# Patient Record
Sex: FEMALE | Race: WHITE | NOT HISPANIC OR LATINO | Employment: OTHER | URBAN - METROPOLITAN AREA
[De-identification: names, ages, dates, MRNs, and addresses within clinical notes are randomized per-mention and may not be internally consistent; named-entity substitution may affect disease eponyms.]

---

## 2021-06-25 ENCOUNTER — TELEPHONE (OUTPATIENT)
Dept: SCHEDULING | Facility: IMAGING CENTER | Age: 61
End: 2021-06-25

## 2021-07-07 ENCOUNTER — TELEPHONE (OUTPATIENT)
Dept: MEDICAL GROUP | Facility: LAB | Age: 61
End: 2021-07-07

## 2021-07-07 SDOH — ECONOMIC STABILITY: HOUSING INSECURITY
IN THE LAST 12 MONTHS, WAS THERE A TIME WHEN YOU DID NOT HAVE A STEADY PLACE TO SLEEP OR SLEPT IN A SHELTER (INCLUDING NOW)?: NO

## 2021-07-07 SDOH — ECONOMIC STABILITY: INCOME INSECURITY: HOW HARD IS IT FOR YOU TO PAY FOR THE VERY BASICS LIKE FOOD, HOUSING, MEDICAL CARE, AND HEATING?: NOT HARD AT ALL

## 2021-07-07 SDOH — ECONOMIC STABILITY: FOOD INSECURITY: WITHIN THE PAST 12 MONTHS, YOU WORRIED THAT YOUR FOOD WOULD RUN OUT BEFORE YOU GOT MONEY TO BUY MORE.: NEVER TRUE

## 2021-07-07 SDOH — HEALTH STABILITY: PHYSICAL HEALTH: ON AVERAGE, HOW MANY MINUTES DO YOU ENGAGE IN EXERCISE AT THIS LEVEL?: 150+ MIN

## 2021-07-07 SDOH — HEALTH STABILITY: PHYSICAL HEALTH: ON AVERAGE, HOW MANY DAYS PER WEEK DO YOU ENGAGE IN MODERATE TO STRENUOUS EXERCISE (LIKE A BRISK WALK)?: 4 DAYS

## 2021-07-07 SDOH — ECONOMIC STABILITY: FOOD INSECURITY: WITHIN THE PAST 12 MONTHS, THE FOOD YOU BOUGHT JUST DIDN'T LAST AND YOU DIDN'T HAVE MONEY TO GET MORE.: NEVER TRUE

## 2021-07-07 SDOH — ECONOMIC STABILITY: INCOME INSECURITY: IN THE LAST 12 MONTHS, WAS THERE A TIME WHEN YOU WERE NOT ABLE TO PAY THE MORTGAGE OR RENT ON TIME?: NO

## 2021-07-07 SDOH — HEALTH STABILITY: MENTAL HEALTH
STRESS IS WHEN SOMEONE FEELS TENSE, NERVOUS, ANXIOUS, OR CAN'T SLEEP AT NIGHT BECAUSE THEIR MIND IS TROUBLED. HOW STRESSED ARE YOU?: NOT AT ALL

## 2021-07-07 SDOH — ECONOMIC STABILITY: TRANSPORTATION INSECURITY
IN THE PAST 12 MONTHS, HAS THE LACK OF TRANSPORTATION KEPT YOU FROM MEDICAL APPOINTMENTS OR FROM GETTING MEDICATIONS?: NO

## 2021-07-07 SDOH — ECONOMIC STABILITY: HOUSING INSECURITY: IN THE LAST 12 MONTHS, HOW MANY PLACES HAVE YOU LIVED?: 2

## 2021-07-07 SDOH — ECONOMIC STABILITY: TRANSPORTATION INSECURITY
IN THE PAST 12 MONTHS, HAS LACK OF RELIABLE TRANSPORTATION KEPT YOU FROM MEDICAL APPOINTMENTS, MEETINGS, WORK OR FROM GETTING THINGS NEEDED FOR DAILY LIVING?: NO

## 2021-07-07 SDOH — ECONOMIC STABILITY: TRANSPORTATION INSECURITY
IN THE PAST 12 MONTHS, HAS LACK OF TRANSPORTATION KEPT YOU FROM MEETINGS, WORK, OR FROM GETTING THINGS NEEDED FOR DAILY LIVING?: NO

## 2021-07-07 ASSESSMENT — SOCIAL DETERMINANTS OF HEALTH (SDOH)
DO YOU BELONG TO ANY CLUBS OR ORGANIZATIONS SUCH AS CHURCH GROUPS UNIONS, FRATERNAL OR ATHLETIC GROUPS, OR SCHOOL GROUPS?: NO
HOW HARD IS IT FOR YOU TO PAY FOR THE VERY BASICS LIKE FOOD, HOUSING, MEDICAL CARE, AND HEATING?: NOT HARD AT ALL
HOW OFTEN DO YOU ATTEND CHURCH OR RELIGIOUS SERVICES?: NEVER
HOW OFTEN DO YOU HAVE SIX OR MORE DRINKS ON ONE OCCASION: NEVER
IN A TYPICAL WEEK, HOW MANY TIMES DO YOU TALK ON THE PHONE WITH FAMILY, FRIENDS, OR NEIGHBORS?: ONCE A WEEK
WITHIN THE PAST 12 MONTHS, YOU WORRIED THAT YOUR FOOD WOULD RUN OUT BEFORE YOU GOT THE MONEY TO BUY MORE: NEVER TRUE
DO YOU BELONG TO ANY CLUBS OR ORGANIZATIONS SUCH AS CHURCH GROUPS UNIONS, FRATERNAL OR ATHLETIC GROUPS, OR SCHOOL GROUPS?: NO
HOW OFTEN DO YOU ATTEND CHURCH OR RELIGIOUS SERVICES?: NEVER
IN A TYPICAL WEEK, HOW MANY TIMES DO YOU TALK ON THE PHONE WITH FAMILY, FRIENDS, OR NEIGHBORS?: ONCE A WEEK
HOW OFTEN DO YOU GET TOGETHER WITH FRIENDS OR RELATIVES?: ONCE A WEEK
HOW OFTEN DO YOU HAVE A DRINK CONTAINING ALCOHOL: 2-4 TIMES A MONTH
HOW OFTEN DO YOU GET TOGETHER WITH FRIENDS OR RELATIVES?: ONCE A WEEK

## 2021-07-07 ASSESSMENT — LIFESTYLE VARIABLES
HOW OFTEN DO YOU HAVE A DRINK CONTAINING ALCOHOL: 2-4 TIMES A MONTH
HOW OFTEN DO YOU HAVE SIX OR MORE DRINKS ON ONE OCCASION: NEVER

## 2021-07-07 NOTE — TELEPHONE ENCOUNTER
NEW PATIENT VISIT PRE-VISIT PLANNING    1.  E[employer] Patient is checked in Patient Demographics?Yes    2.  Immunizations were updated in Epic using Reconcile Outside Information activity? Yes         3.  Is this appointment scheduled as a Hospital Follow-Up? No    4.  Patient is due for the following Health Maintenance Topics:   Health Maintenance Due   Topic Date Due   • HEPATITIS C SCREENING  Never done   • IMM DTaP/Tdap/Td Vaccine (1 - Tdap) Never done   • PAP SMEAR  Never done   • MAMMOGRAM  Never done   • COLONOSCOPY  Never done   • IMM ZOSTER VACCINES (1 of 2) Never done     5.  Reviewed/Updated the following with patient:       •   Preferred Pharmacy? No       •   Preferred Lab? No       •   Preferred Communication? No       •   Allergies? No       •   Medications? NO    6.  Updated Care Team?       •   DME Company (gait device, O2, CPAP, etc.) NO       •   Other Specialists (eye doctor, derm, GYN, cardiology, endo, etc): NO    7.  AHA (Puls8) form printed for Provider? N/A

## 2021-07-07 NOTE — TELEPHONE ENCOUNTER
Left message for patient to call back regarding pre-visit planning. Please transfer call to [phone]

## 2021-07-14 ENCOUNTER — OFFICE VISIT (OUTPATIENT)
Dept: MEDICAL GROUP | Facility: LAB | Age: 61
End: 2021-07-14
Payer: COMMERCIAL

## 2021-07-14 VITALS
SYSTOLIC BLOOD PRESSURE: 92 MMHG | HEART RATE: 66 BPM | TEMPERATURE: 103 F | BODY MASS INDEX: 19.61 KG/M2 | WEIGHT: 122 LBS | HEIGHT: 66 IN | DIASTOLIC BLOOD PRESSURE: 64 MMHG | RESPIRATION RATE: 14 BRPM | OXYGEN SATURATION: 98 %

## 2021-07-14 DIAGNOSIS — Z13.6 SCREENING FOR CARDIOVASCULAR CONDITION: ICD-10-CM

## 2021-07-14 DIAGNOSIS — Z12.12 SCREENING FOR COLORECTAL CANCER: ICD-10-CM

## 2021-07-14 DIAGNOSIS — Z76.89 ENCOUNTER TO ESTABLISH CARE: ICD-10-CM

## 2021-07-14 DIAGNOSIS — Z11.59 NEED FOR HEPATITIS C SCREENING TEST: ICD-10-CM

## 2021-07-14 DIAGNOSIS — Z17.0 MALIGNANT NEOPLASM OF UPPER-INNER QUADRANT OF LEFT BREAST IN FEMALE, ESTROGEN RECEPTOR POSITIVE (HCC): ICD-10-CM

## 2021-07-14 DIAGNOSIS — Z12.11 SCREENING FOR COLORECTAL CANCER: ICD-10-CM

## 2021-07-14 DIAGNOSIS — C50.212 MALIGNANT NEOPLASM OF UPPER-INNER QUADRANT OF LEFT BREAST IN FEMALE, ESTROGEN RECEPTOR POSITIVE (HCC): ICD-10-CM

## 2021-07-14 PROBLEM — C50.919 CHEK2-RELATED BREAST CANCER (HCC): Status: ACTIVE | Noted: 2020-11-17

## 2021-07-14 PROBLEM — Z15.89 CHEK2-RELATED BREAST CANCER (HCC): Status: ACTIVE | Noted: 2020-11-17

## 2021-07-14 PROBLEM — Z15.02 CHEK2-RELATED BREAST CANCER (HCC): Status: ACTIVE | Noted: 2020-11-17

## 2021-07-14 PROBLEM — Z15.09 CHEK2-RELATED BREAST CANCER (HCC): Status: ACTIVE | Noted: 2020-11-17

## 2021-07-14 PROCEDURE — 99203 OFFICE O/P NEW LOW 30 MIN: CPT | Performed by: FAMILY MEDICINE

## 2021-07-14 ASSESSMENT — ENCOUNTER SYMPTOMS
NERVOUS/ANXIOUS: 0
WHEEZING: 0
DEPRESSION: 0
DIZZINESS: 0
ABDOMINAL PAIN: 0
BLURRED VISION: 0
NAUSEA: 0
CHILLS: 0
HEADACHES: 0
PALPITATIONS: 0
VOMITING: 0
DOUBLE VISION: 0
FEVER: 0
SHORTNESS OF BREATH: 0
DIARRHEA: 0

## 2021-07-14 ASSESSMENT — PATIENT HEALTH QUESTIONNAIRE - PHQ9: CLINICAL INTERPRETATION OF PHQ2 SCORE: 0

## 2021-07-14 NOTE — PROGRESS NOTES
Carmela Galloway is a 61 y.o. female here for   Chief Complaint   Patient presents with   • Establish Care       HPI:  Carmela is a very pleasant 61 y.o. female.     #Establish care   -Reviewed all past medical history, family history, social history.  -Reviewed all screening/vaccinations: Patient is due for colonoscopy, Pap smear, lab work including lipid profile, hepatitis C screening.  -Diet and Exercise: Appropriate for age, living in [facility], hiking regularly.  -Tobacco, alcohol, recreational drug use: Occasional alcohol use.  Denies any tobacco, recreational illicit drug use.  -Sexually active: Monogamous with male partner, no concerns regarding relationship  -Occupation: Retired nutritional therapist.    #Breast cancer   -Diagnosed in end of Oct 2020.   -s/p lumpectomy  -s/p hormone therapy.   -Has been following up with oncologist on regular intervals back in [place]; however, since she is moved she is now looking for referral for further follow-up here in [place].  -Patient states he is feeling well, has no concerns, questions at this time.    Current medicines (including changes today)  Current Outpatient Medications   Medication Sig Dispense Refill   • BETAINE PO Take  by mouth. Plus HP       No current facility-administered medications for this visit.     She  has a past medical history of Cancer (HCC).  She  has a past surgical history that includes lumpectomy and eye surgery.  Social History     Tobacco Use   • Smoking status: Never Smoker   • Smokeless tobacco: Never Used   Vaping Use   • Vaping Use: Never used   Substance Use Topics   • Alcohol use: Yes     Comment: 1-2 glasses of wine per week   • Drug use: Never     Social History     Social History Narrative   • Not on file     Family History   Problem Relation Age of Onset   • Arthritis Mother    • Cancer Mother    • Stroke Mother    • Cancer Maternal Grandmother    • Parkinson's Disease Father      Family Status   Relation Name Status   •  "Mo Gabby [place] (Not Specified)   • MGMo Lupe [place] (Not Specified)   • Fa Johanna [place] (Not Specified)       ROS  Review of Systems   Constitutional: Negative for chills and fever.   HENT: Negative for hearing loss.    Eyes: Negative for blurred vision and double vision.   Respiratory: Negative for shortness of breath and wheezing.    Cardiovascular: Negative for chest pain and palpitations.   Gastrointestinal: Negative for abdominal pain, diarrhea, nausea and vomiting.   Neurological: Negative for dizziness and headaches.   Psychiatric/Behavioral: Negative for depression. The patient is not nervous/anxious.         Objective:     BP (!) 92/64   Pulse 66   Temp (!) 39.4 °C (103 °F) (Temporal)   Resp 14   Ht 1.67 m (5' 5.75\")   Wt 55.3 kg (122 lb)   SpO2 98%  Body mass index is 19.84 kg/m².  Physical Exam:    Constitutional: Alert, no distress.  Skin: Warm, dry, good turgor, no rashes in visible areas.  Eye: Equal, round and reactive, conjunctiva clear, lids normal.  ENMT: Lips without lesions, good dentition, oropharynx clear. TM's pearly gray with normal light reflexes bilaterally  Neck: Trachea midline, no masses, no thyromegaly. No cervical or supraclavicular lymphadenopathy.  Respiratory: Unlabored respiratory effort, lungs clear to auscultation bilaterally, no wheezes, rales, or ronchi.  Cardiovascular: Normal S1, S2, RRR, no murmur, no edema.  Abdomen: Soft, non-tender, no masses, no hepatosplenomegaly.  Psych: Alert and oriented x3, normal affect and mood.      Assessment and Plan:   The following treatment plan was discussed    1. Encounter to establish care  -All questions concerns were answered at this time.  -Vaccinations/screenings needed at this time: see below   -Labs reviewed, no concerns. Will recheck labs as below.   -Discussed the importance of a healthy, Mediterranean style diet, routine exercise regimen.  -Discussed general safety measures including seatbelts, helmets, avoidance of " smoking, sunscreen, hydration.  -Follow-up for general physical exam on a yearly basis, sooner if needed.  - CBC WITHOUT DIFFERENTIAL; Future  - Comp Metabolic Panel; Future    2. Malignant neoplasm of upper-inner quadrant of left breast in female, estrogen receptor positive (HCC)  -Status: stable. No concerns at this time. Will refer to oncology for regular follow up.   - REFERRAL TO HEMATOLOGY ONCOLOGY    4. Screening for colorectal cancer  - REFERRAL TO GI FOR COLONOSCOPY    5. Screening for cardiovascular condition  - Lipid Profile; Future    6. Need for hepatitis C screening test  - HEP C VIRUS ANTIBODY; Future      Records requested.  Followup: Return in about 1 year (around 7/14/2022), or if symptoms worsen or fail to improve.         This note was created using voice recognition software. I have made every reasonable attempt to correct errors, however, I do anticipate some grammatical errors.

## 2021-11-29 ENCOUNTER — HOSPITAL ENCOUNTER (OUTPATIENT)
Dept: LAB | Facility: MEDICAL CENTER | Age: 61
End: 2021-11-29
Attending: FAMILY MEDICINE
Payer: COMMERCIAL

## 2021-11-29 DIAGNOSIS — Z13.6 SCREENING FOR CARDIOVASCULAR CONDITION: ICD-10-CM

## 2021-11-29 DIAGNOSIS — Z76.89 ENCOUNTER TO ESTABLISH CARE: ICD-10-CM

## 2021-11-29 DIAGNOSIS — Z11.59 NEED FOR HEPATITIS C SCREENING TEST: ICD-10-CM

## 2021-11-29 LAB
ALBUMIN SERPL BCP-MCNC: 4.4 G/DL (ref 3.2–4.9)
ALBUMIN/GLOB SERPL: 2 G/DL
ALP SERPL-CCNC: 64 U/L (ref 30–99)
ALT SERPL-CCNC: 21 U/L (ref 2–50)
ANION GAP SERPL CALC-SCNC: 8 MMOL/L (ref 7–16)
AST SERPL-CCNC: 20 U/L (ref 12–45)
BILIRUB SERPL-MCNC: 0.4 MG/DL (ref 0.1–1.5)
BUN SERPL-MCNC: 18 MG/DL (ref 8–22)
CALCIUM SERPL-MCNC: 9.5 MG/DL (ref 8.5–10.5)
CHLORIDE SERPL-SCNC: 105 MMOL/L (ref 96–112)
CHOLEST SERPL-MCNC: 224 MG/DL (ref 100–199)
CO2 SERPL-SCNC: 27 MMOL/L (ref 20–33)
CREAT SERPL-MCNC: 0.68 MG/DL (ref 0.5–1.4)
ERYTHROCYTE [DISTWIDTH] IN BLOOD BY AUTOMATED COUNT: 45.5 FL (ref 35.9–50)
FASTING STATUS PATIENT QL REPORTED: NORMAL
GLOBULIN SER CALC-MCNC: 2.2 G/DL (ref 1.9–3.5)
GLUCOSE SERPL-MCNC: 96 MG/DL (ref 65–99)
HCT VFR BLD AUTO: 43.5 % (ref 37–47)
HCV AB SER QL: NORMAL
HDLC SERPL-MCNC: 97 MG/DL
HGB BLD-MCNC: 14.1 G/DL (ref 12–16)
LDLC SERPL CALC-MCNC: 118 MG/DL
MCH RBC QN AUTO: 31.1 PG (ref 27–33)
MCHC RBC AUTO-ENTMCNC: 32.4 G/DL (ref 33.6–35)
MCV RBC AUTO: 95.8 FL (ref 81.4–97.8)
PLATELET # BLD AUTO: 196 K/UL (ref 164–446)
PMV BLD AUTO: 10.9 FL (ref 9–12.9)
POTASSIUM SERPL-SCNC: 4.2 MMOL/L (ref 3.6–5.5)
PROT SERPL-MCNC: 6.6 G/DL (ref 6–8.2)
RBC # BLD AUTO: 4.54 M/UL (ref 4.2–5.4)
SODIUM SERPL-SCNC: 140 MMOL/L (ref 135–145)
TRIGL SERPL-MCNC: 44 MG/DL (ref 0–149)
WBC # BLD AUTO: 3.9 K/UL (ref 4.8–10.8)

## 2021-11-29 PROCEDURE — 36415 COLL VENOUS BLD VENIPUNCTURE: CPT

## 2021-11-29 PROCEDURE — 86803 HEPATITIS C AB TEST: CPT

## 2021-11-29 PROCEDURE — 85027 COMPLETE CBC AUTOMATED: CPT

## 2021-11-29 PROCEDURE — 80061 LIPID PANEL: CPT

## 2021-11-29 PROCEDURE — 80053 COMPREHEN METABOLIC PANEL: CPT

## 2022-02-07 ENCOUNTER — PRE-ADMISSION TESTING (OUTPATIENT)
Dept: ADMISSIONS | Facility: MEDICAL CENTER | Age: 62
DRG: 331 | End: 2022-02-07
Attending: SURGERY
Payer: COMMERCIAL

## 2022-02-07 DIAGNOSIS — Z01.812 PRE-OPERATIVE LABORATORY EXAMINATION: ICD-10-CM

## 2022-02-07 LAB
ANION GAP SERPL CALC-SCNC: 12 MMOL/L (ref 7–16)
BUN SERPL-MCNC: 16 MG/DL (ref 8–22)
CALCIUM SERPL-MCNC: 9.5 MG/DL (ref 8.5–10.5)
CHLORIDE SERPL-SCNC: 102 MMOL/L (ref 96–112)
CO2 SERPL-SCNC: 23 MMOL/L (ref 20–33)
CREAT SERPL-MCNC: 0.6 MG/DL (ref 0.5–1.4)
EKG IMPRESSION: NORMAL
ERYTHROCYTE [DISTWIDTH] IN BLOOD BY AUTOMATED COUNT: 43.3 FL (ref 35.9–50)
GLUCOSE SERPL-MCNC: 104 MG/DL (ref 65–99)
HCT VFR BLD AUTO: 41.1 % (ref 37–47)
HGB BLD-MCNC: 13.7 G/DL (ref 12–16)
MCH RBC QN AUTO: 31.9 PG (ref 27–33)
MCHC RBC AUTO-ENTMCNC: 33.3 G/DL (ref 33.6–35)
MCV RBC AUTO: 95.8 FL (ref 81.4–97.8)
PLATELET # BLD AUTO: 191 K/UL (ref 164–446)
PMV BLD AUTO: 10.6 FL (ref 9–12.9)
POTASSIUM SERPL-SCNC: 4 MMOL/L (ref 3.6–5.5)
RBC # BLD AUTO: 4.29 M/UL (ref 4.2–5.4)
SODIUM SERPL-SCNC: 137 MMOL/L (ref 135–145)
WBC # BLD AUTO: 5.3 K/UL (ref 4.8–10.8)

## 2022-02-07 PROCEDURE — 85027 COMPLETE CBC AUTOMATED: CPT

## 2022-02-07 PROCEDURE — 93010 ELECTROCARDIOGRAM REPORT: CPT | Performed by: INTERNAL MEDICINE

## 2022-02-07 PROCEDURE — 93005 ELECTROCARDIOGRAM TRACING: CPT

## 2022-02-07 PROCEDURE — 36415 COLL VENOUS BLD VENIPUNCTURE: CPT

## 2022-02-07 PROCEDURE — 80048 BASIC METABOLIC PNL TOTAL CA: CPT

## 2022-02-07 ASSESSMENT — FIBROSIS 4 INDEX: FIB4 SCORE: 1.4

## 2022-02-25 ENCOUNTER — PRE-ADMISSION TESTING (OUTPATIENT)
Dept: ADMISSIONS | Facility: MEDICAL CENTER | Age: 62
DRG: 331 | End: 2022-02-25
Attending: SURGERY
Payer: COMMERCIAL

## 2022-02-25 DIAGNOSIS — Z01.812 PRE-OPERATIVE LABORATORY EXAMINATION: ICD-10-CM

## 2022-02-25 LAB — COVID ORDER STATUS COVID19: NORMAL

## 2022-02-25 PROCEDURE — U0005 INFEC AGEN DETEC AMPLI PROBE: HCPCS

## 2022-02-25 PROCEDURE — U0003 INFECTIOUS AGENT DETECTION BY NUCLEIC ACID (DNA OR RNA); SEVERE ACUTE RESPIRATORY SYNDROME CORONAVIRUS 2 (SARS-COV-2) (CORONAVIRUS DISEASE [COVID-19]), AMPLIFIED PROBE TECHNIQUE, MAKING USE OF HIGH THROUGHPUT TECHNOLOGIES AS DESCRIBED BY CMS-2020-01-R: HCPCS

## 2022-02-26 LAB
SARS-COV-2 RNA RESP QL NAA+PROBE: NOTDETECTED
SPECIMEN SOURCE: NORMAL

## 2022-03-01 ENCOUNTER — ANESTHESIA (OUTPATIENT)
Dept: SURGERY | Facility: MEDICAL CENTER | Age: 62
DRG: 331 | End: 2022-03-01
Payer: COMMERCIAL

## 2022-03-01 ENCOUNTER — ANESTHESIA EVENT (OUTPATIENT)
Dept: SURGERY | Facility: MEDICAL CENTER | Age: 62
DRG: 331 | End: 2022-03-01
Payer: COMMERCIAL

## 2022-03-01 ENCOUNTER — HOSPITAL ENCOUNTER (INPATIENT)
Facility: MEDICAL CENTER | Age: 62
LOS: 2 days | DRG: 331 | End: 2022-03-03
Attending: SURGERY | Admitting: SURGERY
Payer: COMMERCIAL

## 2022-03-01 DIAGNOSIS — G89.18 POST-OP PAIN: ICD-10-CM

## 2022-03-01 LAB
GLUCOSE BLD STRIP.AUTO-MCNC: 92 MG/DL (ref 65–99)
PATHOLOGY CONSULT NOTE: NORMAL

## 2022-03-01 PROCEDURE — 160002 HCHG RECOVERY MINUTES (STAT): Performed by: SURGERY

## 2022-03-01 PROCEDURE — 88342 IMHCHEM/IMCYTCHM 1ST ANTB: CPT

## 2022-03-01 PROCEDURE — 88305 TISSUE EXAM BY PATHOLOGIST: CPT

## 2022-03-01 PROCEDURE — 160048 HCHG OR STATISTICAL LEVEL 1-5: Performed by: SURGERY

## 2022-03-01 PROCEDURE — 160009 HCHG ANES TIME/MIN: Performed by: SURGERY

## 2022-03-01 PROCEDURE — 0DTN0ZZ RESECTION OF SIGMOID COLON, OPEN APPROACH: ICD-10-PCS | Performed by: SURGERY

## 2022-03-01 PROCEDURE — 160042 HCHG SURGERY MINUTES - EA ADDL 1 MIN LEVEL 5: Performed by: SURGERY

## 2022-03-01 PROCEDURE — 88309 TISSUE EXAM BY PATHOLOGIST: CPT

## 2022-03-01 PROCEDURE — 700102 HCHG RX REV CODE 250 W/ 637 OVERRIDE(OP): Performed by: INTERNAL MEDICINE

## 2022-03-01 PROCEDURE — 700104 HCHG RX REV CODE 254: Performed by: INTERNAL MEDICINE

## 2022-03-01 PROCEDURE — 501838 HCHG SUTURE GENERAL: Performed by: SURGERY

## 2022-03-01 PROCEDURE — A6402 STERILE GAUZE <= 16 SQ IN: HCPCS | Performed by: SURGERY

## 2022-03-01 PROCEDURE — 82962 GLUCOSE BLOOD TEST: CPT

## 2022-03-01 PROCEDURE — A9270 NON-COVERED ITEM OR SERVICE: HCPCS | Performed by: SURGERY

## 2022-03-01 PROCEDURE — 700111 HCHG RX REV CODE 636 W/ 250 OVERRIDE (IP): Performed by: SURGERY

## 2022-03-01 PROCEDURE — 700101 HCHG RX REV CODE 250: Performed by: SURGERY

## 2022-03-01 PROCEDURE — 160036 HCHG PACU - EA ADDL 30 MINS PHASE I: Performed by: SURGERY

## 2022-03-01 PROCEDURE — 700111 HCHG RX REV CODE 636 W/ 250 OVERRIDE (IP): Performed by: INTERNAL MEDICINE

## 2022-03-01 PROCEDURE — 700101 HCHG RX REV CODE 250: Performed by: INTERNAL MEDICINE

## 2022-03-01 PROCEDURE — 700105 HCHG RX REV CODE 258: Performed by: SURGERY

## 2022-03-01 PROCEDURE — 160035 HCHG PACU - 1ST 60 MINS PHASE I: Performed by: SURGERY

## 2022-03-01 PROCEDURE — 160031 HCHG SURGERY MINUTES - 1ST 30 MINS LEVEL 5: Performed by: SURGERY

## 2022-03-01 PROCEDURE — 700102 HCHG RX REV CODE 250 W/ 637 OVERRIDE(OP): Performed by: SURGERY

## 2022-03-01 PROCEDURE — A9270 NON-COVERED ITEM OR SERVICE: HCPCS | Performed by: INTERNAL MEDICINE

## 2022-03-01 PROCEDURE — 64488 TAP BLOCK BI INJECTION: CPT | Performed by: SURGERY

## 2022-03-01 PROCEDURE — 3E0T3BZ INTRODUCTION OF ANESTHETIC AGENT INTO PERIPHERAL NERVES AND PLEXI, PERCUTANEOUS APPROACH: ICD-10-PCS | Performed by: INTERNAL MEDICINE

## 2022-03-01 PROCEDURE — 0DBP0ZZ EXCISION OF RECTUM, OPEN APPROACH: ICD-10-PCS | Performed by: SURGERY

## 2022-03-01 PROCEDURE — 502714 HCHG ROBOTIC SURGERY SERVICES: Performed by: SURGERY

## 2022-03-01 PROCEDURE — 88341 IMHCHEM/IMCYTCHM EA ADD ANTB: CPT | Mod: 91

## 2022-03-01 PROCEDURE — 8E0W0CZ ROBOTIC ASSISTED PROCEDURE OF TRUNK REGION, OPEN APPROACH: ICD-10-PCS | Performed by: SURGERY

## 2022-03-01 PROCEDURE — 770001 HCHG ROOM/CARE - MED/SURG/GYN PRIV*

## 2022-03-01 RX ORDER — LABETALOL HYDROCHLORIDE 5 MG/ML
5 INJECTION, SOLUTION INTRAVENOUS
Status: DISCONTINUED | OUTPATIENT
Start: 2022-03-01 | End: 2022-03-01 | Stop reason: HOSPADM

## 2022-03-01 RX ORDER — HYDROMORPHONE HYDROCHLORIDE 1 MG/ML
0.4 INJECTION, SOLUTION INTRAMUSCULAR; INTRAVENOUS; SUBCUTANEOUS
Status: DISCONTINUED | OUTPATIENT
Start: 2022-03-01 | End: 2022-03-01 | Stop reason: HOSPADM

## 2022-03-01 RX ORDER — CELECOXIB 200 MG/1
200 CAPSULE ORAL ONCE
Status: COMPLETED | OUTPATIENT
Start: 2022-03-01 | End: 2022-03-01

## 2022-03-01 RX ORDER — OXYCODONE HYDROCHLORIDE 5 MG/1
5 TABLET ORAL
Status: DISCONTINUED | OUTPATIENT
Start: 2022-03-01 | End: 2022-03-03 | Stop reason: HOSPADM

## 2022-03-01 RX ORDER — LIDOCAINE HYDROCHLORIDE 40 MG/ML
SOLUTION TOPICAL PRN
Status: DISCONTINUED | OUTPATIENT
Start: 2022-03-01 | End: 2022-03-01 | Stop reason: SURG

## 2022-03-01 RX ORDER — MIDAZOLAM HYDROCHLORIDE 1 MG/ML
INJECTION INTRAMUSCULAR; INTRAVENOUS PRN
Status: DISCONTINUED | OUTPATIENT
Start: 2022-03-01 | End: 2022-03-01 | Stop reason: SURG

## 2022-03-01 RX ORDER — ACETAMINOPHEN 500 MG
1000 TABLET ORAL ONCE
Status: COMPLETED | OUTPATIENT
Start: 2022-03-01 | End: 2022-03-01

## 2022-03-01 RX ORDER — ACETAMINOPHEN 500 MG
1000 TABLET ORAL EVERY 6 HOURS PRN
Status: DISCONTINUED | OUTPATIENT
Start: 2022-03-06 | End: 2022-03-03

## 2022-03-01 RX ORDER — BUPIVACAINE HYDROCHLORIDE AND EPINEPHRINE 5; 5 MG/ML; UG/ML
INJECTION, SOLUTION EPIDURAL; INTRACAUDAL; PERINEURAL
Status: DISCONTINUED | OUTPATIENT
Start: 2022-03-01 | End: 2022-03-01 | Stop reason: HOSPADM

## 2022-03-01 RX ORDER — SCOLOPAMINE TRANSDERMAL SYSTEM 1 MG/1
1 PATCH, EXTENDED RELEASE TRANSDERMAL
Status: DISCONTINUED | OUTPATIENT
Start: 2022-03-01 | End: 2022-03-01 | Stop reason: HOSPADM

## 2022-03-01 RX ORDER — BUPIVACAINE HYDROCHLORIDE 5 MG/ML
INJECTION, SOLUTION EPIDURAL; INTRACAUDAL PRN
Status: DISCONTINUED | OUTPATIENT
Start: 2022-03-01 | End: 2022-03-01 | Stop reason: SURG

## 2022-03-01 RX ORDER — ONDANSETRON 2 MG/ML
INJECTION INTRAMUSCULAR; INTRAVENOUS PRN
Status: DISCONTINUED | OUTPATIENT
Start: 2022-03-01 | End: 2022-03-01 | Stop reason: SURG

## 2022-03-01 RX ORDER — LIDOCAINE HYDROCHLORIDE 20 MG/ML
INJECTION, SOLUTION EPIDURAL; INFILTRATION; INTRACAUDAL; PERINEURAL PRN
Status: DISCONTINUED | OUTPATIENT
Start: 2022-03-01 | End: 2022-03-01 | Stop reason: SURG

## 2022-03-01 RX ORDER — OXYCODONE HYDROCHLORIDE 5 MG/1
2.5 TABLET ORAL
Status: DISCONTINUED | OUTPATIENT
Start: 2022-03-01 | End: 2022-03-03 | Stop reason: HOSPADM

## 2022-03-01 RX ORDER — PHENYLEPHRINE HCL IN 0.9% NACL 0.5 MG/5ML
SYRINGE (ML) INTRAVENOUS PRN
Status: DISCONTINUED | OUTPATIENT
Start: 2022-03-01 | End: 2022-03-01 | Stop reason: SURG

## 2022-03-01 RX ORDER — HALOPERIDOL 5 MG/ML
1 INJECTION INTRAMUSCULAR
Status: DISCONTINUED | OUTPATIENT
Start: 2022-03-01 | End: 2022-03-01 | Stop reason: HOSPADM

## 2022-03-01 RX ORDER — HALOPERIDOL 5 MG/ML
1 INJECTION INTRAMUSCULAR EVERY 6 HOURS PRN
Status: DISCONTINUED | OUTPATIENT
Start: 2022-03-01 | End: 2022-03-03 | Stop reason: HOSPADM

## 2022-03-01 RX ORDER — METRONIDAZOLE 500 MG/1
500 TABLET ORAL 3 TIMES DAILY
Status: ON HOLD | COMMUNITY
End: 2022-03-01

## 2022-03-01 RX ORDER — NEOMYCIN SULFATE 500 MG/1
1000 TABLET ORAL 3 TIMES DAILY
Status: ON HOLD | COMMUNITY
End: 2022-03-01

## 2022-03-01 RX ORDER — SCOLOPAMINE TRANSDERMAL SYSTEM 1 MG/1
1 PATCH, EXTENDED RELEASE TRANSDERMAL
Status: DISCONTINUED | OUTPATIENT
Start: 2022-03-01 | End: 2022-03-03 | Stop reason: HOSPADM

## 2022-03-01 RX ORDER — HYDROMORPHONE HYDROCHLORIDE 1 MG/ML
0.25 INJECTION, SOLUTION INTRAMUSCULAR; INTRAVENOUS; SUBCUTANEOUS
Status: DISCONTINUED | OUTPATIENT
Start: 2022-03-01 | End: 2022-03-03 | Stop reason: HOSPADM

## 2022-03-01 RX ORDER — KETAMINE HYDROCHLORIDE 50 MG/ML
INJECTION, SOLUTION INTRAMUSCULAR; INTRAVENOUS PRN
Status: DISCONTINUED | OUTPATIENT
Start: 2022-03-01 | End: 2022-03-01 | Stop reason: SURG

## 2022-03-01 RX ORDER — HYDROMORPHONE HYDROCHLORIDE 2 MG/ML
INJECTION, SOLUTION INTRAMUSCULAR; INTRAVENOUS; SUBCUTANEOUS PRN
Status: DISCONTINUED | OUTPATIENT
Start: 2022-03-01 | End: 2022-03-01 | Stop reason: SURG

## 2022-03-01 RX ORDER — DEXAMETHASONE SODIUM PHOSPHATE 4 MG/ML
4 INJECTION, SOLUTION INTRA-ARTICULAR; INTRALESIONAL; INTRAMUSCULAR; INTRAVENOUS; SOFT TISSUE
Status: DISCONTINUED | OUTPATIENT
Start: 2022-03-01 | End: 2022-03-03 | Stop reason: HOSPADM

## 2022-03-01 RX ORDER — ROCURONIUM BROMIDE 10 MG/ML
INJECTION, SOLUTION INTRAVENOUS PRN
Status: DISCONTINUED | OUTPATIENT
Start: 2022-03-01 | End: 2022-03-01 | Stop reason: SURG

## 2022-03-01 RX ORDER — HYDROMORPHONE HYDROCHLORIDE 1 MG/ML
0.2 INJECTION, SOLUTION INTRAMUSCULAR; INTRAVENOUS; SUBCUTANEOUS
Status: DISCONTINUED | OUTPATIENT
Start: 2022-03-01 | End: 2022-03-01 | Stop reason: HOSPADM

## 2022-03-01 RX ORDER — CEFOTETAN DISODIUM 2 G/20ML
INJECTION, POWDER, FOR SOLUTION INTRAMUSCULAR; INTRAVENOUS PRN
Status: DISCONTINUED | OUTPATIENT
Start: 2022-03-01 | End: 2022-03-01 | Stop reason: SURG

## 2022-03-01 RX ORDER — TRAZODONE HYDROCHLORIDE 50 MG/1
50 TABLET ORAL NIGHTLY PRN
Status: DISCONTINUED | OUTPATIENT
Start: 2022-03-01 | End: 2022-03-03 | Stop reason: HOSPADM

## 2022-03-01 RX ORDER — CALCIUM CARBONATE 500 MG/1
500 TABLET, CHEWABLE ORAL
Status: DISCONTINUED | OUTPATIENT
Start: 2022-03-01 | End: 2022-03-03 | Stop reason: HOSPADM

## 2022-03-01 RX ORDER — ONDANSETRON 2 MG/ML
4 INJECTION INTRAMUSCULAR; INTRAVENOUS EVERY 4 HOURS PRN
Status: DISCONTINUED | OUTPATIENT
Start: 2022-03-01 | End: 2022-03-03 | Stop reason: HOSPADM

## 2022-03-01 RX ORDER — SODIUM CHLORIDE, SODIUM LACTATE, POTASSIUM CHLORIDE, CALCIUM CHLORIDE 600; 310; 30; 20 MG/100ML; MG/100ML; MG/100ML; MG/100ML
INJECTION, SOLUTION INTRAVENOUS CONTINUOUS
Status: ACTIVE | OUTPATIENT
Start: 2022-03-01 | End: 2022-03-01

## 2022-03-01 RX ORDER — ONDANSETRON 2 MG/ML
4 INJECTION INTRAMUSCULAR; INTRAVENOUS
Status: DISCONTINUED | OUTPATIENT
Start: 2022-03-01 | End: 2022-03-01 | Stop reason: HOSPADM

## 2022-03-01 RX ORDER — MEPERIDINE HYDROCHLORIDE 25 MG/ML
12.5 INJECTION INTRAMUSCULAR; INTRAVENOUS; SUBCUTANEOUS
Status: DISCONTINUED | OUTPATIENT
Start: 2022-03-01 | End: 2022-03-01 | Stop reason: HOSPADM

## 2022-03-01 RX ORDER — OXYCODONE HCL 5 MG/5 ML
5 SOLUTION, ORAL ORAL
Status: DISCONTINUED | OUTPATIENT
Start: 2022-03-01 | End: 2022-03-01 | Stop reason: HOSPADM

## 2022-03-01 RX ORDER — DEXAMETHASONE SODIUM PHOSPHATE 4 MG/ML
INJECTION, SOLUTION INTRA-ARTICULAR; INTRALESIONAL; INTRAMUSCULAR; INTRAVENOUS; SOFT TISSUE PRN
Status: DISCONTINUED | OUTPATIENT
Start: 2022-03-01 | End: 2022-03-01 | Stop reason: SURG

## 2022-03-01 RX ORDER — INDOCYANINE GREEN AND WATER 25 MG
KIT INJECTION PRN
Status: DISCONTINUED | OUTPATIENT
Start: 2022-03-01 | End: 2022-03-01 | Stop reason: SURG

## 2022-03-01 RX ORDER — DIPHENHYDRAMINE HYDROCHLORIDE 50 MG/ML
25 INJECTION INTRAMUSCULAR; INTRAVENOUS EVERY 6 HOURS PRN
Status: DISCONTINUED | OUTPATIENT
Start: 2022-03-01 | End: 2022-03-03 | Stop reason: HOSPADM

## 2022-03-01 RX ORDER — KETOROLAC TROMETHAMINE 30 MG/ML
INJECTION, SOLUTION INTRAMUSCULAR; INTRAVENOUS PRN
Status: DISCONTINUED | OUTPATIENT
Start: 2022-03-01 | End: 2022-03-01 | Stop reason: SURG

## 2022-03-01 RX ORDER — HYDROMORPHONE HYDROCHLORIDE 1 MG/ML
0.1 INJECTION, SOLUTION INTRAMUSCULAR; INTRAVENOUS; SUBCUTANEOUS
Status: DISCONTINUED | OUTPATIENT
Start: 2022-03-01 | End: 2022-03-01 | Stop reason: HOSPADM

## 2022-03-01 RX ORDER — DIPHENHYDRAMINE HCL 25 MG
25 TABLET ORAL EVERY 6 HOURS PRN
Status: DISCONTINUED | OUTPATIENT
Start: 2022-03-01 | End: 2022-03-03 | Stop reason: HOSPADM

## 2022-03-01 RX ORDER — DIPHENHYDRAMINE HYDROCHLORIDE 50 MG/ML
12.5 INJECTION INTRAMUSCULAR; INTRAVENOUS
Status: DISCONTINUED | OUTPATIENT
Start: 2022-03-01 | End: 2022-03-01 | Stop reason: HOSPADM

## 2022-03-01 RX ORDER — KETOROLAC TROMETHAMINE 30 MG/ML
30 INJECTION, SOLUTION INTRAMUSCULAR; INTRAVENOUS EVERY 6 HOURS
Status: DISCONTINUED | OUTPATIENT
Start: 2022-03-01 | End: 2022-03-03

## 2022-03-01 RX ORDER — ACETAMINOPHEN 500 MG
1000 TABLET ORAL EVERY 6 HOURS
Status: DISCONTINUED | OUTPATIENT
Start: 2022-03-01 | End: 2022-03-03

## 2022-03-01 RX ORDER — IBUPROFEN 800 MG/1
800 TABLET ORAL 3 TIMES DAILY PRN
Status: DISCONTINUED | OUTPATIENT
Start: 2022-03-04 | End: 2022-03-03

## 2022-03-01 RX ORDER — OXYCODONE HCL 5 MG/5 ML
10 SOLUTION, ORAL ORAL
Status: DISCONTINUED | OUTPATIENT
Start: 2022-03-01 | End: 2022-03-01 | Stop reason: HOSPADM

## 2022-03-01 RX ADMIN — HYDROMORPHONE HYDROCHLORIDE 0.2 MG: 1 INJECTION, SOLUTION INTRAMUSCULAR; INTRAVENOUS; SUBCUTANEOUS at 10:17

## 2022-03-01 RX ADMIN — KETAMINE HYDROCHLORIDE 20 MG: 50 INJECTION INTRAMUSCULAR; INTRAVENOUS at 07:51

## 2022-03-01 RX ADMIN — LIDOCAINE HYDROCHLORIDE 3 ML: 40 SOLUTION TOPICAL at 07:53

## 2022-03-01 RX ADMIN — CELECOXIB 200 MG: 200 CAPSULE ORAL at 06:51

## 2022-03-01 RX ADMIN — KETAMINE HYDROCHLORIDE 10 MG: 50 INJECTION INTRAMUSCULAR; INTRAVENOUS at 08:20

## 2022-03-01 RX ADMIN — HYDROMORPHONE HYDROCHLORIDE 0.2 MG: 1 INJECTION, SOLUTION INTRAMUSCULAR; INTRAVENOUS; SUBCUTANEOUS at 10:43

## 2022-03-01 RX ADMIN — FENTANYL CITRATE 25 MCG: 50 INJECTION, SOLUTION INTRAMUSCULAR; INTRAVENOUS at 09:58

## 2022-03-01 RX ADMIN — KETOROLAC TROMETHAMINE 30 MG: 30 INJECTION, SOLUTION INTRAMUSCULAR; INTRAVENOUS at 17:07

## 2022-03-01 RX ADMIN — FENTANYL CITRATE 25 MCG: 50 INJECTION, SOLUTION INTRAMUSCULAR; INTRAVENOUS at 10:35

## 2022-03-01 RX ADMIN — PROPOFOL 100 MG: 10 INJECTION, EMULSION INTRAVENOUS at 07:51

## 2022-03-01 RX ADMIN — LIDOCAINE HYDROCHLORIDE 50 MG: 20 INJECTION, SOLUTION EPIDURAL; INFILTRATION; INTRACAUDAL at 07:51

## 2022-03-01 RX ADMIN — CEFOTETAN DISODIUM 2 G: 2 INJECTION, POWDER, FOR SOLUTION INTRAMUSCULAR; INTRAVENOUS at 08:00

## 2022-03-01 RX ADMIN — SODIUM CHLORIDE, POTASSIUM CHLORIDE, SODIUM LACTATE AND CALCIUM CHLORIDE: 600; 310; 30; 20 INJECTION, SOLUTION INTRAVENOUS at 07:08

## 2022-03-01 RX ADMIN — ACETAMINOPHEN 1000 MG: 500 TABLET ORAL at 17:07

## 2022-03-01 RX ADMIN — HYDROMORPHONE HYDROCHLORIDE 0.2 MG: 1 INJECTION, SOLUTION INTRAMUSCULAR; INTRAVENOUS; SUBCUTANEOUS at 10:11

## 2022-03-01 RX ADMIN — BUPIVACAINE HYDROCHLORIDE 25 ML: 5 INJECTION, SOLUTION EPIDURAL; INTRACAUDAL; PERINEURAL at 07:55

## 2022-03-01 RX ADMIN — FENTANYL CITRATE 25 MCG: 50 INJECTION, SOLUTION INTRAMUSCULAR; INTRAVENOUS at 10:52

## 2022-03-01 RX ADMIN — SODIUM CHLORIDE, POTASSIUM CHLORIDE, SODIUM LACTATE AND CALCIUM CHLORIDE: 600; 310; 30; 20 INJECTION, SOLUTION INTRAVENOUS at 09:03

## 2022-03-01 RX ADMIN — ACETAMINOPHEN 1000 MG: 500 TABLET ORAL at 06:51

## 2022-03-01 RX ADMIN — LIDOCAINE HYDROCHLORIDE 0.5 ML: 10 INJECTION, SOLUTION EPIDURAL; INFILTRATION; INTRACAUDAL; PERINEURAL at 07:08

## 2022-03-01 RX ADMIN — SCOPALAMINE 1 PATCH: 1 PATCH, EXTENDED RELEASE TRANSDERMAL at 06:51

## 2022-03-01 RX ADMIN — HYDROMORPHONE HYDROCHLORIDE 0.25 MG: 2 INJECTION INTRAMUSCULAR; INTRAVENOUS; SUBCUTANEOUS at 09:10

## 2022-03-01 RX ADMIN — INDOCYANINE GREEN AND WATER 7.5 MG: KIT at 08:44

## 2022-03-01 RX ADMIN — ROCURONIUM BROMIDE 50 MG: 10 INJECTION, SOLUTION INTRAVENOUS at 07:51

## 2022-03-01 RX ADMIN — KETOROLAC TROMETHAMINE 15 MG: 30 INJECTION, SOLUTION INTRAMUSCULAR at 09:07

## 2022-03-01 RX ADMIN — HYDROMORPHONE HYDROCHLORIDE 0.2 MG: 1 INJECTION, SOLUTION INTRAMUSCULAR; INTRAVENOUS; SUBCUTANEOUS at 10:30

## 2022-03-01 RX ADMIN — Medication 150 MCG: at 08:12

## 2022-03-01 RX ADMIN — DEXAMETHASONE SODIUM PHOSPHATE 8 MG: 4 INJECTION, SOLUTION INTRA-ARTICULAR; INTRALESIONAL; INTRAMUSCULAR; INTRAVENOUS; SOFT TISSUE at 07:51

## 2022-03-01 RX ADMIN — SUGAMMADEX 200 MG: 100 INJECTION, SOLUTION INTRAVENOUS at 09:19

## 2022-03-01 RX ADMIN — HYDROMORPHONE HYDROCHLORIDE 0.5 MG: 2 INJECTION INTRAMUSCULAR; INTRAVENOUS; SUBCUTANEOUS at 07:51

## 2022-03-01 RX ADMIN — MIDAZOLAM HYDROCHLORIDE 2 MG: 1 INJECTION, SOLUTION INTRAMUSCULAR; INTRAVENOUS at 07:44

## 2022-03-01 RX ADMIN — FENTANYL CITRATE 25 MCG: 50 INJECTION, SOLUTION INTRAMUSCULAR; INTRAVENOUS at 10:02

## 2022-03-01 RX ADMIN — ONDANSETRON 4 MG: 2 INJECTION INTRAMUSCULAR; INTRAVENOUS at 09:00

## 2022-03-01 RX ADMIN — HYDROMORPHONE HYDROCHLORIDE 0.2 MG: 1 INJECTION, SOLUTION INTRAMUSCULAR; INTRAVENOUS; SUBCUTANEOUS at 10:06

## 2022-03-01 ASSESSMENT — COGNITIVE AND FUNCTIONAL STATUS - GENERAL
MOVING TO AND FROM BED TO CHAIR: A LITTLE
DRESSING REGULAR LOWER BODY CLOTHING: A LITTLE
HELP NEEDED FOR BATHING: A LITTLE
DAILY ACTIVITIY SCORE: 21
STANDING UP FROM CHAIR USING ARMS: A LITTLE
MOBILITY SCORE: 20
MOVING FROM LYING ON BACK TO SITTING ON SIDE OF FLAT BED: A LITTLE
CLIMB 3 TO 5 STEPS WITH RAILING: A LITTLE
DRESSING REGULAR UPPER BODY CLOTHING: A LITTLE
SUGGESTED CMS G CODE MODIFIER DAILY ACTIVITY: CJ
SUGGESTED CMS G CODE MODIFIER MOBILITY: CJ

## 2022-03-01 ASSESSMENT — PATIENT HEALTH QUESTIONNAIRE - PHQ9
2. FEELING DOWN, DEPRESSED, IRRITABLE, OR HOPELESS: NOT AT ALL
SUM OF ALL RESPONSES TO PHQ9 QUESTIONS 1 AND 2: 0
SUM OF ALL RESPONSES TO PHQ9 QUESTIONS 1 AND 2: 0
1. LITTLE INTEREST OR PLEASURE IN DOING THINGS: NOT AT ALL
2. FEELING DOWN, DEPRESSED, IRRITABLE, OR HOPELESS: NOT AT ALL
1. LITTLE INTEREST OR PLEASURE IN DOING THINGS: NOT AT ALL

## 2022-03-01 ASSESSMENT — PAIN DESCRIPTION - PAIN TYPE
TYPE: ACUTE PAIN;SURGICAL PAIN
TYPE: ACUTE PAIN;SURGICAL PAIN

## 2022-03-01 ASSESSMENT — LIFESTYLE VARIABLES
TOTAL SCORE: 0
EVER HAD A DRINK FIRST THING IN THE MORNING TO STEADY YOUR NERVES TO GET RID OF A HANGOVER: NO
DOES PATIENT WANT TO STOP DRINKING: NO
ALCOHOL_USE: YES
TOTAL SCORE: 0
HAVE YOU EVER FELT YOU SHOULD CUT DOWN ON YOUR DRINKING: NO
HAVE PEOPLE ANNOYED YOU BY CRITICIZING YOUR DRINKING: NO
AVERAGE NUMBER OF DAYS PER WEEK YOU HAVE A DRINK CONTAINING ALCOHOL: 1
HOW MANY TIMES IN THE PAST YEAR HAVE YOU HAD 5 OR MORE DRINKS IN A DAY: 0
EVER FELT BAD OR GUILTY ABOUT YOUR DRINKING: NO
CONSUMPTION TOTAL: NEGATIVE
TOTAL SCORE: 0
ON A TYPICAL DAY WHEN YOU DRINK ALCOHOL HOW MANY DRINKS DO YOU HAVE: 3

## 2022-03-01 ASSESSMENT — FIBROSIS 4 INDEX: FIB4 SCORE: 1.44

## 2022-03-01 NOTE — ANESTHESIA PROCEDURE NOTES
Peripheral Block    Date/Time: 3/1/2022 7:55 AM  Performed by: Lenny Acosta M.D.  Authorized by: Lenny Acosta M.D.     Patient Location:  OR  Start Time:  3/1/2022 7:55 AM  End Time:  3/1/2022 7:57 AM  Reason for Block: at surgeon's request and post-op pain management ONLY    patient identified, IV checked, site marked, risks and benefits discussed, surgical consent, monitors and equipment checked, pre-op evaluation and timeout performed    Patient Position:  Supine  Prep: ChloraPrep    Monitoring:  Heart rate, continuous pulse ox and cardiac monitor  Block Region:  Trunk  Trunk - Block Type:  Abdominal plane block - TAP block    Laterality:  Bilateral  Procedures: ultrasound guided  Image captured, interpreted and electronically stored.  Local Infiltration:  Lidocaine  Strength:  1 %  Dose:  3 ml  Block Type:  Single-shot  Needle Length:  100mm  Needle Gauge:  21 G  Needle Localization:  Ultrasound guidance  Injection Assessment:  Negative aspiration for heme, no paresthesia on injection, incremental injection and local visualized surrounding nerve on ultrasound  Evidence of intravascular injection: No     US Guided Transversus Abdominis Plane (TAP) Block   US probe placed at the anterior axillary line between the costal margin and iliac crest in an axial plane. External Oblique, Internal Oblique (IO) and Transversis Abdominis (TA) muscles identified.  Needle inserted anteromedial to probe in an in plane approach and advanced under direct visualization to TAP between IO and TA muscled.  After negative aspiration LA injected with ease and visualized spreading in TAP plane.

## 2022-03-01 NOTE — ANESTHESIA PREPROCEDURE EVALUATION
Case: [id number] Date/Time: 03/01/22 0715    Procedure: RESECTION, LOW ANTERIOR, ROBOT-ASSISTED, LAPAROSCOPIC, USING DA MICAELA XI    Pre-op diagnosis: MALIGNANT TUMOR OF COLON    Location: [facility] OR 11 / SURGERY [facility]    Surgeons: Israel Gamboa M.D.        Hx of recently diagnosed colon CA.   Relevant Problems   Other   (positive) Malignant neoplasm of upper-inner quadrant of left breast in female, estrogen receptor positive (HCC)       Physical Exam    Airway   Mallampati: II  TM distance: >3 FB  Neck ROM: full       Cardiovascular - normal exam  Rhythm: regular  Rate: normal  (-) murmur     Dental - normal exam           Pulmonary - normal exam  Breath sounds clear to auscultation     Abdominal    Neurological - normal exam                 Anesthesia Plan    ASA 2       Plan - general and peripheral nerve block     Peripheral nerve block will be post-op pain control  Airway plan will be ETT          Induction: intravenous    Postoperative Plan: Postoperative administration of opioids is intended.    Pertinent diagnostic labs and testing reviewed    Informed Consent:    Anesthetic plan and risks discussed with patient.    Use of blood products discussed with: patient whom consented to blood products.

## 2022-03-01 NOTE — ANESTHESIA TIME REPORT
Anesthesia Start and Stop Event Times     Date Time Event    3/1/2022 0715 Ready for Procedure     0744 Anesthesia Start     0928 Anesthesia Stop        Responsible Staff  03/01/22    Name Role Begin End    Lenny Acosta M.D. Anesth 0744 0928        Preop Diagnosis (Free Text):  Pre-op Diagnosis     Colon Cancer        Preop Diagnosis (Codes):    Premium Reason  Non-Premium    Comments: Block

## 2022-03-01 NOTE — PROGRESS NOTES
Patient here from PACU. Ambulated to chair with standby assist.  Declines pain, N/V at this time.

## 2022-03-01 NOTE — ANESTHESIA PROCEDURE NOTES
Airway    Date/Time: 3/1/2022 7:53 AM  Performed by: Lenny Acosta M.D.  Authorized by: Lenny Acosta M.D.     Location:  OR  Urgency:  Elective  Indications for Airway Management:  Anesthesia      Spontaneous Ventilation: absent    Sedation Level:  Deep  Preoxygenated: Yes    Patient Position:  Sniffing  Final Airway Type:  Endotracheal airway  Final Endotracheal Airway:  ETT  Cuffed: Yes    Technique Used for Successful ETT Placement:  Direct laryngoscopy  Devices/Methods Used in Placement:  Anterior pressure/BURP    Insertion Site:  Oral  Blade Type:  Shan  Laryngoscope Blade/Videolaryngoscope Blade Size:  3  ETT Size (mm):  7.0  Measured from:  Teeth  ETT to Teeth (cm):  21  Placement Verified by: auscultation and capnometry    Cormack-Lehane Classification:  Grade IIa - partial view of glottis  Number of Attempts at Approach:  1

## 2022-03-01 NOTE — ANESTHESIA POSTPROCEDURE EVALUATION
Patient: Carmela Galloway    Procedure Summary     Date: 03/01/22 Room / Location: [place] [id number] / SURGERY [facility]    Anesthesia Start: 0744 Anesthesia Stop: 0928    Procedure: RESECTION, LOW ANTERIOR, ROBOT-ASSISTED, LAPAROSCOPIC, USING DA MICAELA XI (N/A Abdomen) Diagnosis: (Colon Cancer)    Surgeons: Israel Gamboa M.D. Responsible Provider: Lenny Acosta M.D.    Anesthesia Type: general, peripheral nerve block ASA Status: 2          Final Anesthesia Type: general, peripheral nerve block  Last vitals  BP   Blood Pressure: 105/55    Temp   36.7 °C (98.1 °F)    Pulse   63   Resp   16    SpO2   100 %      Anesthesia Post Evaluation    Patient location during evaluation: PACU  Patient participation: complete - patient participated  Level of consciousness: awake and alert    Airway patency: patent  Anesthetic complications: no  Cardiovascular status: hemodynamically stable  Respiratory status: acceptable  Hydration status: euvolemic    PONV: none          No complications documented.     Nurse Pain Score: 0 (NPRS)

## 2022-03-01 NOTE — OR NURSING
PACU note- Respirations easy.  Pain meds w good effect.  No nausea.  Five op sites clean and dry with surgical glue. Ice pack to abdomen.  DES to bulb sx, serosanguinous drainage.  Burt to gravity drainage, urine clear yellow, secured to right leg.  Report to floor,  updated times 2 while pt in PACU.

## 2022-03-01 NOTE — PROGRESS NOTES
4 Eyes Skin Assessment Completed by SARITA Jara and SARITA Crane.    Head WDL  Ears WDL  Nose WDL  Mouth WDL  Neck WDL  Breast/Chest WDL  Shoulder Blades WDL  Spine WDL  (R) Arm/Elbow/Hand WDL  (L) Arm/Elbow/Hand WDL  Abdomen Incision x 3 lap stabs and 1 transverse, all DB and HEMA. DES to RUQ, dressing in place, CDI.  Groin WDL  Scrotum/Coccyx/Buttocks WDL  (R) Leg WDL  (L) Leg WDL  (R) Heel/Foot/Toe WDL  (L) Heel/Foot/Toe WDL          Devices In Places Pulse Ox, Burt and SCD's      Interventions In Place Pressure Redistribution Mattress    Possible Skin Injury No    Pictures Uploaded Into Epic N/A  Wound Consult Placed N/A  RN Wound Prevention Protocol Ordered No

## 2022-03-02 LAB
ANION GAP SERPL CALC-SCNC: 10 MMOL/L (ref 7–16)
BUN SERPL-MCNC: 14 MG/DL (ref 8–22)
CALCIUM SERPL-MCNC: 9 MG/DL (ref 8.5–10.5)
CHLORIDE SERPL-SCNC: 104 MMOL/L (ref 96–112)
CO2 SERPL-SCNC: 22 MMOL/L (ref 20–33)
CREAT SERPL-MCNC: 0.82 MG/DL (ref 0.5–1.4)
ERYTHROCYTE [DISTWIDTH] IN BLOOD BY AUTOMATED COUNT: 43.5 FL (ref 35.9–50)
GLUCOSE SERPL-MCNC: 108 MG/DL (ref 65–99)
HCT VFR BLD AUTO: 40.4 % (ref 37–47)
HGB BLD-MCNC: 13.3 G/DL (ref 12–16)
MAGNESIUM SERPL-MCNC: 1.8 MG/DL (ref 1.5–2.5)
MCH RBC QN AUTO: 31.3 PG (ref 27–33)
MCHC RBC AUTO-ENTMCNC: 32.9 G/DL (ref 33.6–35)
MCV RBC AUTO: 95.1 FL (ref 81.4–97.8)
PLATELET # BLD AUTO: 176 K/UL (ref 164–446)
PMV BLD AUTO: 10.6 FL (ref 9–12.9)
POTASSIUM SERPL-SCNC: 4.2 MMOL/L (ref 3.6–5.5)
RBC # BLD AUTO: 4.25 M/UL (ref 4.2–5.4)
SODIUM SERPL-SCNC: 136 MMOL/L (ref 135–145)
WBC # BLD AUTO: 8.5 K/UL (ref 4.8–10.8)

## 2022-03-02 PROCEDURE — 700111 HCHG RX REV CODE 636 W/ 250 OVERRIDE (IP): Performed by: SURGERY

## 2022-03-02 PROCEDURE — 85027 COMPLETE CBC AUTOMATED: CPT

## 2022-03-02 PROCEDURE — 770001 HCHG ROOM/CARE - MED/SURG/GYN PRIV*

## 2022-03-02 PROCEDURE — A9270 NON-COVERED ITEM OR SERVICE: HCPCS | Performed by: SURGERY

## 2022-03-02 PROCEDURE — 83735 ASSAY OF MAGNESIUM: CPT

## 2022-03-02 PROCEDURE — 80048 BASIC METABOLIC PNL TOTAL CA: CPT

## 2022-03-02 PROCEDURE — 700102 HCHG RX REV CODE 250 W/ 637 OVERRIDE(OP): Performed by: SURGERY

## 2022-03-02 RX ADMIN — KETOROLAC TROMETHAMINE 30 MG: 30 INJECTION, SOLUTION INTRAMUSCULAR; INTRAVENOUS at 05:58

## 2022-03-02 RX ADMIN — KETOROLAC TROMETHAMINE 30 MG: 30 INJECTION, SOLUTION INTRAMUSCULAR; INTRAVENOUS at 11:11

## 2022-03-02 RX ADMIN — ACETAMINOPHEN 1000 MG: 500 TABLET ORAL at 11:12

## 2022-03-02 RX ADMIN — ACETAMINOPHEN 1000 MG: 500 TABLET ORAL at 17:44

## 2022-03-02 RX ADMIN — ACETAMINOPHEN 1000 MG: 500 TABLET ORAL at 00:20

## 2022-03-02 RX ADMIN — KETOROLAC TROMETHAMINE 30 MG: 30 INJECTION, SOLUTION INTRAMUSCULAR; INTRAVENOUS at 00:20

## 2022-03-02 RX ADMIN — ENOXAPARIN SODIUM 40 MG: 40 INJECTION SUBCUTANEOUS at 05:58

## 2022-03-02 RX ADMIN — ACETAMINOPHEN 1000 MG: 500 TABLET ORAL at 05:58

## 2022-03-02 RX ADMIN — KETOROLAC TROMETHAMINE 30 MG: 30 INJECTION, SOLUTION INTRAMUSCULAR; INTRAVENOUS at 17:44

## 2022-03-02 ASSESSMENT — PAIN DESCRIPTION - PAIN TYPE
TYPE: SURGICAL PAIN
TYPE: ACUTE PAIN
TYPE: ACUTE PAIN;SURGICAL PAIN

## 2022-03-02 NOTE — DISCHARGE PLANNING
Anticipated Discharge Disposition:   Home    Action:   Pt has DES drain and Per Dr Vasquez' notes plan is to keep Pt's drain until output is decreased.    This RN CM requested PFA to screen Pt for Insurance.     Barriers to Discharge:   Pending medical clearance    Plan:   CM to continue to assist Pt with discharge as needed

## 2022-03-02 NOTE — OP REPORT
DATE OF SERVICE:  03/01/2022     PREOPERATIVE DIAGNOSIS:  Colon cancer.     POSTOPERATIVE DIAGNOSIS:  Colon cancer.     PROCEDURES:  Robotic low anterior resection.     SURGEON:  Israel Gamboa MD     ASSISTANT:  NASH Bey     ANESTHESIA:  General endotracheal anesthesia.     ESTIMATED BLOOD LOSS:  25 mL.     SPECIMENS:  Sigmoid colon and proximal rectum.     COMPLICATIONS:  None.     CONDITION:  Stable.     INDICATIONS FOR PROCEDURE:  This is a 61-year-old female who presents with a   previously identified cancer of the rectosigmoid junction.  Risks, benefits   and alternatives of surgery explained to her before proceeding.     OPERATIVE FINDINGS:  Tattoo and tumor identified at the rectosigmoid junction.    A low anterior resection completed with colorectal anastomosis.     OPERATIVE TECHNIQUE:                      after adequate endotracheal   anesthesia was achieved, the patient was switched to lithotomy position and   the anus and rectum were washed out with Betadine.  We then prepped and draped   the abdomen and perineum in sterile fashion.  Operation was then begun by                                          sigmoid colon medially.  We then opened   the retroperitoneum along the right side and isolated the CAMI pedicle.  This   was ligated at its origin with a vessel sealer device.  We ligated branches of   the IMV as well and then continued mobilizing the descending colon up off the   retroperitoneum.  We divided the mesentery up to the proximal sigmoid colon   at the planned anastomotic site, which was well clear of the tumor and   contained the major robert basin of the cancer.     Next, we continued dissecting toward the pelvic floor following the presacral   bloodless plane, we took down right and left lateral attachments and mobilized   the anterior cul-de-sac.  We then had good mobility of the rectum.  We   identified a point in the rectum, that was clear of the tumor and divided the    mesentery with a vessel sealer device.  We then divided the rectal wall itself   with a green load APPLE-75 staple fire with hemostasis.     Next, we made an extraction incision in the left lower quadrant and carried   this dissection down with electrocautery to the level of fascia, which was   incised.  We then extracted the surgical specimen after inserting an Elie   wound retractor.  We divided the colon at the healthy planned anastomotic   site, at which point, there appeared to be excellent blood flow.  We loaded a   29 EEA anvil into the end of the colon using a Furness clamp.  We then washed   this with Betadine, reduced it back into the abdominal cavity and clamped off   the wound retractor.     Using clean gloves and instruments, we now laparoscopically observed as a 29   EEA stapler was inserted.  Dung was deployed, anvil attached and end-to-end   anastomosis carried out with hemostasis.  Insufflation test was performed and   no evidence of leak was noted.  We irrigated the pelvis with warm saline and   hemostasis was noted as well.  We then placed a 19-Albanian round drain within   the pelvis through one of the trocar sites, which was sewn to the skin with a   2-0 nylon and hooked to bulb suction at the end of the case.  We then closed   the fascia at the 12 mm trocar site using a 0 PDS with an EndoClose device.    Pneumoperitoneum was reduced.  All trocars were removed and we began using a   closing Tariq tray.     Using clean gloves and instruments, we then closed the fascia at the   extraction site using running 0 PDS sutures on the posterior and anterior   rectus sheaths.  Local anesthetic block was given to the level of the fascia.    We closed the deep tissues with 3-0 Vicryl.  We closed all wounds then with   4-0 Monocryl and Dermabond.  The patient was then returned to the PACU in   stable condition.  All instrument counts were correct at the end of the   procedure.     Case required an assistant  due to the complexity of the procedure.  The   assistant helped with trocar placement, camera operation, robotic operation,   retraction during dissection, hemostatic maneuvers, anastomosis, placement and   wound closures.        ______________________________  MD CHAPARRO Nguyen/ANA    DD:  03/01/2022 14:16  DT:  03/01/2022 15:30    Job#:  [id number]

## 2022-03-02 NOTE — CARE PLAN
The patient is Stable - Low risk of patient condition declining or worsening    Shift Goals  Clinical Goals: Pain control, OOB activity, monitor DES output  Patient Goals: Pain control, rest, discharge  Family Goals: No family present at this time.    Problem: Knowledge Deficit - Standard  Goal: Patient and family/care givers will demonstrate understanding of plan of care, disease process/condition, diagnostic tests and medications  Outcome: Progressing     Problem: Pain - Standard  Goal: Alleviation of pain or a reduction in pain to the patient’s comfort goal  Outcome: Progressing     Progress made toward(s) clinical / shift goals:      Patient updated on current plan of care and verbalizes understanding.   Pain is controlled with current medications per MAR.       Patient is not progressing towards the following goals:

## 2022-03-02 NOTE — PROGRESS NOTES
Pt catheter removed per order. Educated on need to void within 6 hours of removal Pt verbalizes understanding

## 2022-03-02 NOTE — PROGRESS NOTES
Pt is A&O 4  Pain - to educated on availability of PRN medication    - nausea  Tolerating a regular diet   Incision +  + Voids via shin   - flatus  - BM  Up SBA  SCD's on  Bed alarm off , pt low fall risk per shelia hernandez  Reviewed plan of care with patient, bed in lowest position and locked, pt resting comfortably now, call light within reach, all needs met at this time. Interventions will be executed per plan of care

## 2022-03-02 NOTE — PROGRESS NOTES
"3/2  S:  61 y.o.female s/p Robotic LAR  POD# 1.  Patient doing well, tolerating diet, ambulating.  No gas or bowel movement yet.  No n/v at this time.      O:  /49   Pulse 68   Temp 36.8 °C (98.3 °F) (Temporal)   Resp 18   Ht 1.626 m (5' 4\")   Wt 56.7 kg (125 lb)   SpO2 98%   I/O last 3 completed shifts:  In: 1415 [P.O.:240; I.V.:1175]  Out: 2452 [Urine:1725; Drains:712]  Recent Labs     03/02/22  0127   SODIUM 136   POTASSIUM 4.2   CHLORIDE 104   CO2 22   GLUCOSE 108*   BUN 14   CREATININE 0.82   CALCIUM 9.0     Recent Labs     03/02/22  0127   WBC 8.5   RBC 4.25   HEMOGLOBIN 13.3   HEMATOCRIT 40.4   MCV 95.1   MCH 31.3   MCHC 32.9*   RDW 43.5   PLATELETCT 176   MPV 10.6       Alert and Oriented x3, No Acute Distress  Normal Respiratory Effort  Abdomen soft, appropriately tender  Incisions/Bandages clean/dry/intact  Extremities warm and well perfused  DES Output Serosanguinous- 712cc    A/P:  Pain control with PO meds   Diet as tolerated  Fluids SLIV  Ambulate tid and ad stephanie  DES drain to remain until output decreases    "

## 2022-03-02 NOTE — PROGRESS NOTES
Bedside shift report received from night shift RN. Patient is A+Ox4 and is resting comfortably. Patient states 2/10 pain and denies need for pain medication at this time. Surgical incisions CDI and DES to the LLQ. Patient updated on current plan of care and shift goals established. No further needs at this time. Bed locked in lowest position, upper bed rails up, call light and belongings within reach. Hourly rounding in place.

## 2022-03-03 ENCOUNTER — PATIENT OUTREACH (OUTPATIENT)
Dept: HEALTH INFORMATION MANAGEMENT | Facility: OTHER | Age: 62
End: 2022-03-03
Payer: COMMERCIAL

## 2022-03-03 VITALS
HEIGHT: 64 IN | DIASTOLIC BLOOD PRESSURE: 62 MMHG | OXYGEN SATURATION: 94 % | HEART RATE: 85 BPM | BODY MASS INDEX: 21.34 KG/M2 | RESPIRATION RATE: 17 BRPM | WEIGHT: 125 LBS | SYSTOLIC BLOOD PRESSURE: 100 MMHG | TEMPERATURE: 98.4 F

## 2022-03-03 LAB
ANION GAP SERPL CALC-SCNC: 11 MMOL/L (ref 7–16)
BUN SERPL-MCNC: 19 MG/DL (ref 8–22)
CALCIUM SERPL-MCNC: 8.7 MG/DL (ref 8.5–10.5)
CHLORIDE SERPL-SCNC: 106 MMOL/L (ref 96–112)
CO2 SERPL-SCNC: 24 MMOL/L (ref 20–33)
CREAT SERPL-MCNC: 0.77 MG/DL (ref 0.5–1.4)
ERYTHROCYTE [DISTWIDTH] IN BLOOD BY AUTOMATED COUNT: 44.2 FL (ref 35.9–50)
GLUCOSE SERPL-MCNC: 113 MG/DL (ref 65–99)
HCT VFR BLD AUTO: 39.8 % (ref 37–47)
HGB BLD-MCNC: 13.1 G/DL (ref 12–16)
MCH RBC QN AUTO: 31.3 PG (ref 27–33)
MCHC RBC AUTO-ENTMCNC: 32.9 G/DL (ref 33.6–35)
MCV RBC AUTO: 95.2 FL (ref 81.4–97.8)
PLATELET # BLD AUTO: 157 K/UL (ref 164–446)
PMV BLD AUTO: 10.3 FL (ref 9–12.9)
POTASSIUM SERPL-SCNC: 4.1 MMOL/L (ref 3.6–5.5)
RBC # BLD AUTO: 4.18 M/UL (ref 4.2–5.4)
SODIUM SERPL-SCNC: 141 MMOL/L (ref 135–145)
WBC # BLD AUTO: 9.1 K/UL (ref 4.8–10.8)

## 2022-03-03 PROCEDURE — 700111 HCHG RX REV CODE 636 W/ 250 OVERRIDE (IP): Performed by: SURGERY

## 2022-03-03 PROCEDURE — 80048 BASIC METABOLIC PNL TOTAL CA: CPT

## 2022-03-03 PROCEDURE — A9270 NON-COVERED ITEM OR SERVICE: HCPCS | Performed by: SURGERY

## 2022-03-03 PROCEDURE — 85027 COMPLETE CBC AUTOMATED: CPT

## 2022-03-03 PROCEDURE — 700102 HCHG RX REV CODE 250 W/ 637 OVERRIDE(OP): Performed by: SURGERY

## 2022-03-03 RX ORDER — OXYCODONE HYDROCHLORIDE AND ACETAMINOPHEN 5; 325 MG/1; MG/1
1 TABLET ORAL EVERY 4 HOURS PRN
Qty: 20 TABLET | Refills: 0 | Status: SHIPPED | OUTPATIENT
Start: 2022-03-03 | End: 2022-03-06

## 2022-03-03 RX ORDER — IBUPROFEN 800 MG/1
800 TABLET ORAL 3 TIMES DAILY PRN
Status: DISCONTINUED | OUTPATIENT
Start: 2022-03-04 | End: 2022-03-03 | Stop reason: HOSPADM

## 2022-03-03 RX ORDER — ACETAMINOPHEN 500 MG
1000 TABLET ORAL EVERY 6 HOURS
Status: DISCONTINUED | OUTPATIENT
Start: 2022-03-03 | End: 2022-03-03 | Stop reason: HOSPADM

## 2022-03-03 RX ORDER — KETOROLAC TROMETHAMINE 30 MG/ML
30 INJECTION, SOLUTION INTRAMUSCULAR; INTRAVENOUS EVERY 6 HOURS
Status: DISCONTINUED | OUTPATIENT
Start: 2022-03-03 | End: 2022-03-03 | Stop reason: HOSPADM

## 2022-03-03 RX ORDER — ACETAMINOPHEN 500 MG
1000 TABLET ORAL EVERY 6 HOURS PRN
Status: DISCONTINUED | OUTPATIENT
Start: 2022-03-06 | End: 2022-03-03 | Stop reason: HOSPADM

## 2022-03-03 RX ADMIN — ENOXAPARIN SODIUM 40 MG: 40 INJECTION SUBCUTANEOUS at 05:41

## 2022-03-03 RX ADMIN — ACETAMINOPHEN 1000 MG: 500 TABLET ORAL at 10:39

## 2022-03-03 RX ADMIN — KETOROLAC TROMETHAMINE 30 MG: 30 INJECTION, SOLUTION INTRAMUSCULAR at 10:39

## 2022-03-03 NOTE — PROGRESS NOTES
Bedside shift report received from night shift RN. Patient is A+Ox4 and is resting comfortably. Patient states 2/10 pain and denies need for pain medication at this time. Patient updated on current plan of care and shift goals established. No further needs at this time. Bed locked in lowest position, upper bed rails up, call light and belongings within reach. Hourly rounding in place.

## 2022-03-03 NOTE — CARE PLAN
The patient is Stable - Low risk of patient condition declining or worsening    Shift Goals  Clinical Goals: OOB activity  Patient Goals: pain control  Family Goals: No family present at this time.    Progress made toward(s) clinical / shift goals:  pt ambulated w/ family. Pt still reporting gas discomfort.     Problem: Knowledge Deficit - Standard  Goal: Patient and family/care givers will demonstrate understanding of plan of care, disease process/condition, diagnostic tests and medications  Outcome: Progressing     Problem: Pain - Standard  Goal: Alleviation of pain or a reduction in pain to the patient’s comfort goal  Outcome: Progressing       Patient is not progressing towards the following goals:

## 2022-03-03 NOTE — PROGRESS NOTES
Pt is A&O 4  Pain -    - nausea  Tolerating a regular diet   Incision +  + Voids   + flatus  - BM  Up SBA  SCD's off  Bed alarm off , pt low fall risk per shelia hernandez  Reviewed plan of care with patient, bed in lowest position and locked, pt resting comfortably now, call light within reach, all needs met at this time. Interventions will be executed per plan of care

## 2022-03-03 NOTE — PROGRESS NOTES
Patient sent to the discharge lounge. All belongings collected. No further questions or concerns at this time.

## 2022-03-03 NOTE — DISCHARGE INSTRUCTIONS
Discharge Instructions    Discharged to home by car with relative. Discharged via wheelchair, hospital escort: Yes.  Special equipment needed: Not Applicable    Be sure to schedule a follow-up appointment with your primary care doctor or any specialists as instructed.     Discharge Plan:   Diet Plan: Discussed  Activity Level: Discussed  Confirmed Follow up Appointment: Appointment Scheduled  Confirmed Symptoms Management: Discussed  Medication Reconciliation Updated: Yes  Influenza Vaccine Indication: Patient Refuses    I understand that a diet low in cholesterol, fat, and sodium is recommended for good health. Unless I have been given specific instructions below for another diet, I accept this instruction as my diet prescription.   Other diet: Low fiber    Special Instructions: None    · Is patient discharged on Warfarin / Coumadin?   No     Depression / Suicide Risk    As you are discharged from this [facility] facility, it is important to learn how to keep safe from harming yourself.    Recognize the warning signs:  · Abrupt changes in personality, positive or negative- including increase in energy   · Giving away possessions  · Change in eating patterns- significant weight changes-  positive or negative  · Change in sleeping patterns- unable to sleep or sleeping all the time   · Unwillingness or inability to communicate  · Depression  · Unusual sadness, discouragement and loneliness  · Talk of wanting to die  · Neglect of personal appearance   · Rebelliousness- reckless behavior  · Withdrawal from people/activities they love  · Confusion- inability to concentrate     If you or a loved one observes any of these behaviors or has concerns about self-harm, here's what you can do:  · Talk about it- your feelings and reasons for harming yourself  · Remove any means that you might use to hurt yourself (examples: pills, rope, extension cords, firearm)  · Get professional help from the community (Mental Health,  Substance Abuse, psychological counseling)  · Do not be alone:Call your Safe Contact- someone whom you trust who will be there for you.  · Call your local CRISIS HOTLINE 714-7486 or 965-540-9301  · Call your local Children's Mobile Crisis Response Team [place] (116) 696-9424 or www.Aire[employer]  · Call the toll free National Suicide Prevention Hotlines   · National Suicide Prevention Lifeline 924-894-KVWP (3466)  · [employer] Hope Line Network 800-SUICIDE (581-6782)        Laparoscopic Small Bowel Resection, Care After  This sheet gives you information about how to care for yourself after your procedure. Your health care provider may also give you more specific instructions. If you have problems or questions, contact your health care provider.  What can I expect after the procedure?  After your procedure, it is common to have:  · Pain in your abdomen, especially in the incision areas. You will be given pain medicines to control this.  · Tiredness. This is a normal part of the recovery process. Your energy level will return to normal over the next several weeks.  · Constipation. You may be given a stool softener to help prevent this.  Follow these instructions at home:  Medicines  · Take over-the-counter and prescription medicines only as told by your health care provider.  · Do not drive or use heavy machinery while taking prescription pain medicine.  · If you were prescribed an antibiotic medicine, use it as told by your health care provider. Do not stop using the antibiotic even if you start to feel better.  Incision care    · Follow instructions from your health care provider about how to take care of your incision areas. Keep your incisions clean and dry. Make sure you:  ? Wash your hands with soap and water before you change your bandage (dressing). If soap and water are not available, use hand .  ? Change your dressing as told by your health care provider.  ? Leave stitches (sutures), skin  glue, or adhesive strips in place. These skin closures may need to stay in place for 2 weeks or longer. If adhesive strip edges start to loosen and curl up, you may trim the loose edges. Do not remove adhesive strips completely unless your health care provider tells you to do that.  · Check your incision areas every day for signs of infection. Check for:  ? More redness, swelling, or pain.  ? More fluid or blood.  ? Warmth.  ? Pus or a bad smell.  Bathing  · Use showers for bathing as told by your health care provider.  · Do not take baths, swim, or use a hot tub until your health care provider approves.  Activity  · Return to your normal activities as told by your health care provider. Ask your health care provider what activities are safe for you.  · Do not lift anything that is heavier than 15 lb (6.8 kg) for 4 weeks or until your health care provider says it is okay.  · Do not drive until your health care provider approves.  · Take frequent rest breaks during the day as needed.  · Take short walks often. Try to go for a 10-minute walk, 2 times a day.  · Avoid activities that take a lot of effort (are strenuous), such as running or aerobics, for at least 6 weeks or until your health care provider approves.  General instructions  · You may eat what you usually do.  · To prevent or treat constipation while you are taking prescription pain medicine, your health care provider may recommend that you:  ? Drink enough fluid to keep your urine clear or pale yellow.  ? Take over-the-counter or prescription medicines.  ? Eat foods that are high in fiber, such as fresh fruits and vegetables, whole grains, and beans.  ? Limit foods that are high in fat and processed sugars, such as fried and sweet foods.  · Do not use any products that contain nicotine or tobacco, such as cigarettes and e-cigarettes. If you need help quitting, ask your health care provider.  · Keep all follow-up visits as told by your health care provider.  This is important.  Contact a health care provider if:  · You have more redness, swelling, or pain in the incision areas.  · You have more fluid or blood coming from your incisions.  · Your incisions feel warm to the touch.  · You have pus or a bad smell coming from the incision areas.  · You have a fever or chills.  · Your incisions break open.  · You have bleeding from the rectum.  · You have swelling of your abdomen.  · You cannot eat or drink without vomiting.  Get help right away if:  · You develop a rash.  · You have increasing pain in your shoulders (shoulder strap area).  · You feel dizzy or you faint while standing.  · You have shortness of breath or difficulty breathing.  · You have persistent nausea or vomiting.  · You have increased abdominal pain.  · You cannot have a bowel movement or pass gas.  This information is not intended to replace advice given to you by your health care provider. Make sure you discuss any questions you have with your health care provider.  Document Released: 07/07/2006 Document Revised: 11/30/2018 Document Reviewed: 09/18/2017  [employer] Patient Education © 2020 [employer]    Diet as tolerated  May shower daily with wounds uncovered  No heavy lifting or straining for 6 weeks after surgery

## 2022-03-03 NOTE — DISCHARGE SUMMARY
Discharge Summary      DATE OF ADMISSION: 3/1/2022    DATE OF DISCHARGE: 3/3/2022    ADMISSION DIAGNOSIS (ES):  ? Colon Cancer    DISCHARGE DIAGNOSIS (ES):  ? same    DISCHARGE CONDITION:  ? stable    CONSULTATIONS:  ? none    PROCEDURES:  ? Robotic LAR     BRIEF HPI:  ? 61y F here with newly diagnosed cancer in the rectosigmoid colon, here for elective surgery.  Completed a bowel prep before presented for operation.      HOSPITAL COURSE:  ? Underwent above surgery without complication.  Post-op recovery was routine.  Pt was able to ambulate and had prompt return of bowel function.  DES drain was removed before discharge.  At the time of discharge home, wounds appeared to be healing well    MEDS:   Current Outpatient Medications   Medication Sig Dispense Refill   • oxyCODONE-acetaminophen (PERCOCET) 5-325 MG Tab Take 1 Tablet by mouth every four hours as needed (pain) for up to 7 days. 20 Tablet 0       FOLLOW-UP:  ? Please call my office at [phone] to make an appointment in 1 weeks    DISCHARGE INSTRUCTIONS:

## 2022-03-06 ENCOUNTER — HOSPITAL ENCOUNTER (INPATIENT)
Facility: MEDICAL CENTER | Age: 62
LOS: 3 days | DRG: 394 | End: 2022-03-09
Attending: EMERGENCY MEDICINE | Admitting: INTERNAL MEDICINE
Payer: COMMERCIAL

## 2022-03-06 ENCOUNTER — APPOINTMENT (OUTPATIENT)
Dept: RADIOLOGY | Facility: MEDICAL CENTER | Age: 62
DRG: 394 | End: 2022-03-06
Attending: EMERGENCY MEDICINE
Payer: COMMERCIAL

## 2022-03-06 DIAGNOSIS — E86.0 DEHYDRATION: ICD-10-CM

## 2022-03-06 DIAGNOSIS — R10.13 EPIGASTRIC PAIN: ICD-10-CM

## 2022-03-06 DIAGNOSIS — K56.7 ILEUS (HCC): ICD-10-CM

## 2022-03-06 PROBLEM — K91.89 ILEUS FOLLOWING GASTROINTESTINAL SURGERY (HCC): Status: ACTIVE | Noted: 2022-03-06

## 2022-03-06 PROBLEM — D75.1 POLYCYTHEMIA: Status: ACTIVE | Noted: 2022-03-06

## 2022-03-06 PROBLEM — R74.01 TRANSAMINITIS: Status: ACTIVE | Noted: 2022-03-06

## 2022-03-06 PROBLEM — C80.1 ADENOCARCINOMA (HCC): Status: ACTIVE | Noted: 2022-03-06

## 2022-03-06 LAB
ALBUMIN SERPL BCP-MCNC: 4.2 G/DL (ref 3.2–4.9)
ALBUMIN/GLOB SERPL: 1.3 G/DL
ALP SERPL-CCNC: 75 U/L (ref 30–99)
ALT SERPL-CCNC: 146 U/L (ref 2–50)
ANION GAP SERPL CALC-SCNC: 15 MMOL/L (ref 7–16)
APPEARANCE UR: ABNORMAL
AST SERPL-CCNC: 70 U/L (ref 12–45)
BACTERIA #/AREA URNS HPF: NEGATIVE /HPF
BASOPHILS # BLD AUTO: 0.3 % (ref 0–1.8)
BASOPHILS # BLD: 0.03 K/UL (ref 0–0.12)
BILIRUB SERPL-MCNC: 0.3 MG/DL (ref 0.1–1.5)
BILIRUB UR QL STRIP.AUTO: NEGATIVE
BUN SERPL-MCNC: 15 MG/DL (ref 8–22)
CALCIUM SERPL-MCNC: 9.8 MG/DL (ref 8.5–10.5)
CAOX CRY #/AREA URNS HPF: NORMAL /HPF
CHLORIDE SERPL-SCNC: 102 MMOL/L (ref 96–112)
CO2 SERPL-SCNC: 21 MMOL/L (ref 20–33)
COLOR UR: YELLOW
CREAT SERPL-MCNC: 0.8 MG/DL (ref 0.5–1.4)
EOSINOPHIL # BLD AUTO: 0.02 K/UL (ref 0–0.51)
EOSINOPHIL NFR BLD: 0.2 % (ref 0–6.9)
EPI CELLS #/AREA URNS HPF: NEGATIVE /HPF
ERYTHROCYTE [DISTWIDTH] IN BLOOD BY AUTOMATED COUNT: 43.4 FL (ref 35.9–50)
GLOBULIN SER CALC-MCNC: 3.2 G/DL (ref 1.9–3.5)
GLUCOSE SERPL-MCNC: 119 MG/DL (ref 65–99)
GLUCOSE UR STRIP.AUTO-MCNC: NEGATIVE MG/DL
HCT VFR BLD AUTO: 49.7 % (ref 37–47)
HGB BLD-MCNC: 16.7 G/DL (ref 12–16)
HYALINE CASTS #/AREA URNS LPF: NORMAL /LPF
IMM GRANULOCYTES # BLD AUTO: 0.04 K/UL (ref 0–0.11)
IMM GRANULOCYTES NFR BLD AUTO: 0.4 % (ref 0–0.9)
KETONES UR STRIP.AUTO-MCNC: 15 MG/DL
LEUKOCYTE ESTERASE UR QL STRIP.AUTO: NEGATIVE
LIPASE SERPL-CCNC: 25 U/L (ref 11–82)
LYMPHOCYTES # BLD AUTO: 0.81 K/UL (ref 1–4.8)
LYMPHOCYTES NFR BLD: 8.4 % (ref 22–41)
MCH RBC QN AUTO: 31.5 PG (ref 27–33)
MCHC RBC AUTO-ENTMCNC: 33.6 G/DL (ref 33.6–35)
MCV RBC AUTO: 93.8 FL (ref 81.4–97.8)
MICRO URNS: ABNORMAL
MONOCYTES # BLD AUTO: 0.44 K/UL (ref 0–0.85)
MONOCYTES NFR BLD AUTO: 4.6 % (ref 0–13.4)
NEUTROPHILS # BLD AUTO: 8.25 K/UL (ref 2–7.15)
NEUTROPHILS NFR BLD: 86.1 % (ref 44–72)
NITRITE UR QL STRIP.AUTO: NEGATIVE
NRBC # BLD AUTO: 0 K/UL
NRBC BLD-RTO: 0 /100 WBC
PH UR STRIP.AUTO: 6 [PH] (ref 5–8)
PLATELET # BLD AUTO: 228 K/UL (ref 164–446)
PMV BLD AUTO: 10.3 FL (ref 9–12.9)
POTASSIUM SERPL-SCNC: 4.7 MMOL/L (ref 3.6–5.5)
PROT SERPL-MCNC: 7.4 G/DL (ref 6–8.2)
PROT UR QL STRIP: NEGATIVE MG/DL
RBC # BLD AUTO: 5.3 M/UL (ref 4.2–5.4)
RBC # URNS HPF: NORMAL /HPF
RBC UR QL AUTO: ABNORMAL
SODIUM SERPL-SCNC: 138 MMOL/L (ref 135–145)
SP GR UR STRIP.AUTO: 1.03
UROBILINOGEN UR STRIP.AUTO-MCNC: 0.2 MG/DL
WBC # BLD AUTO: 9.6 K/UL (ref 4.8–10.8)
WBC #/AREA URNS HPF: NORMAL /HPF

## 2022-03-06 PROCEDURE — 80053 COMPREHEN METABOLIC PANEL: CPT

## 2022-03-06 PROCEDURE — 81001 URINALYSIS AUTO W/SCOPE: CPT

## 2022-03-06 PROCEDURE — 85025 COMPLETE CBC W/AUTO DIFF WBC: CPT

## 2022-03-06 PROCEDURE — 99223 1ST HOSP IP/OBS HIGH 75: CPT | Performed by: INTERNAL MEDICINE

## 2022-03-06 PROCEDURE — 74177 CT ABD & PELVIS W/CONTRAST: CPT

## 2022-03-06 PROCEDURE — 700111 HCHG RX REV CODE 636 W/ 250 OVERRIDE (IP): Performed by: EMERGENCY MEDICINE

## 2022-03-06 PROCEDURE — 700117 HCHG RX CONTRAST REV CODE 255: Performed by: EMERGENCY MEDICINE

## 2022-03-06 PROCEDURE — 700105 HCHG RX REV CODE 258: Performed by: EMERGENCY MEDICINE

## 2022-03-06 PROCEDURE — 700105 HCHG RX REV CODE 258: Performed by: INTERNAL MEDICINE

## 2022-03-06 PROCEDURE — 83690 ASSAY OF LIPASE: CPT

## 2022-03-06 PROCEDURE — 700111 HCHG RX REV CODE 636 W/ 250 OVERRIDE (IP): Performed by: INTERNAL MEDICINE

## 2022-03-06 PROCEDURE — 770001 HCHG ROOM/CARE - MED/SURG/GYN PRIV*

## 2022-03-06 PROCEDURE — 96376 TX/PRO/DX INJ SAME DRUG ADON: CPT

## 2022-03-06 PROCEDURE — 96374 THER/PROPH/DIAG INJ IV PUSH: CPT

## 2022-03-06 PROCEDURE — 36415 COLL VENOUS BLD VENIPUNCTURE: CPT

## 2022-03-06 PROCEDURE — 99285 EMERGENCY DEPT VISIT HI MDM: CPT

## 2022-03-06 RX ORDER — PROCHLORPERAZINE EDISYLATE 5 MG/ML
5-10 INJECTION INTRAMUSCULAR; INTRAVENOUS EVERY 4 HOURS PRN
Status: DISCONTINUED | OUTPATIENT
Start: 2022-03-06 | End: 2022-03-09 | Stop reason: HOSPADM

## 2022-03-06 RX ORDER — ONDANSETRON 4 MG/1
4 TABLET, ORALLY DISINTEGRATING ORAL EVERY 4 HOURS PRN
Status: DISCONTINUED | OUTPATIENT
Start: 2022-03-06 | End: 2022-03-09 | Stop reason: HOSPADM

## 2022-03-06 RX ORDER — ONDANSETRON 2 MG/ML
4 INJECTION INTRAMUSCULAR; INTRAVENOUS ONCE
Status: COMPLETED | OUTPATIENT
Start: 2022-03-06 | End: 2022-03-06

## 2022-03-06 RX ORDER — PROMETHAZINE HYDROCHLORIDE 25 MG/1
12.5-25 TABLET ORAL EVERY 4 HOURS PRN
Status: DISCONTINUED | OUTPATIENT
Start: 2022-03-06 | End: 2022-03-09 | Stop reason: HOSPADM

## 2022-03-06 RX ORDER — PROMETHAZINE HYDROCHLORIDE 25 MG/1
12.5-25 SUPPOSITORY RECTAL EVERY 4 HOURS PRN
Status: DISCONTINUED | OUTPATIENT
Start: 2022-03-06 | End: 2022-03-09 | Stop reason: HOSPADM

## 2022-03-06 RX ORDER — ACETAMINOPHEN 500 MG
500-1000 TABLET ORAL EVERY 6 HOURS PRN
COMMUNITY
End: 2022-10-14

## 2022-03-06 RX ORDER — SODIUM CHLORIDE, SODIUM LACTATE, POTASSIUM CHLORIDE, CALCIUM CHLORIDE 600; 310; 30; 20 MG/100ML; MG/100ML; MG/100ML; MG/100ML
INJECTION, SOLUTION INTRAVENOUS CONTINUOUS
Status: DISCONTINUED | OUTPATIENT
Start: 2022-03-06 | End: 2022-03-07

## 2022-03-06 RX ORDER — ONDANSETRON 2 MG/ML
4 INJECTION INTRAMUSCULAR; INTRAVENOUS EVERY 4 HOURS PRN
Status: DISCONTINUED | OUTPATIENT
Start: 2022-03-06 | End: 2022-03-09 | Stop reason: HOSPADM

## 2022-03-06 RX ORDER — ACETAMINOPHEN 325 MG/1
650 TABLET ORAL EVERY 6 HOURS PRN
Status: DISCONTINUED | OUTPATIENT
Start: 2022-03-06 | End: 2022-03-09 | Stop reason: HOSPADM

## 2022-03-06 RX ORDER — KETOROLAC TROMETHAMINE 30 MG/ML
30 INJECTION, SOLUTION INTRAMUSCULAR; INTRAVENOUS EVERY 6 HOURS PRN
Status: DISCONTINUED | OUTPATIENT
Start: 2022-03-06 | End: 2022-03-09 | Stop reason: HOSPADM

## 2022-03-06 RX ADMIN — IOHEXOL 25 ML: 240 INJECTION, SOLUTION INTRATHECAL; INTRAVASCULAR; INTRAVENOUS; ORAL at 13:11

## 2022-03-06 RX ADMIN — SODIUM CHLORIDE, POTASSIUM CHLORIDE, SODIUM LACTATE AND CALCIUM CHLORIDE: 600; 310; 30; 20 INJECTION, SOLUTION INTRAVENOUS at 19:26

## 2022-03-06 RX ADMIN — SODIUM CHLORIDE, POTASSIUM CHLORIDE, SODIUM LACTATE AND CALCIUM CHLORIDE: 600; 310; 30; 20 INJECTION, SOLUTION INTRAVENOUS at 11:52

## 2022-03-06 RX ADMIN — ONDANSETRON 4 MG: 2 INJECTION INTRAMUSCULAR; INTRAVENOUS at 11:51

## 2022-03-06 RX ADMIN — ONDANSETRON 4 MG: 2 INJECTION INTRAMUSCULAR; INTRAVENOUS at 18:10

## 2022-03-06 RX ADMIN — SODIUM CHLORIDE, POTASSIUM CHLORIDE, SODIUM LACTATE AND CALCIUM CHLORIDE: 600; 310; 30; 20 INJECTION, SOLUTION INTRAVENOUS at 16:02

## 2022-03-06 RX ADMIN — IOHEXOL 80 ML: 350 INJECTION, SOLUTION INTRAVENOUS at 13:10

## 2022-03-06 ASSESSMENT — FIBROSIS 4 INDEX: FIB4 SCORE: 1.75

## 2022-03-06 ASSESSMENT — LIFESTYLE VARIABLES
DOES PATIENT WANT TO STOP DRINKING: NO
TOTAL SCORE: 0
HOW MANY TIMES IN THE PAST YEAR HAVE YOU HAD 5 OR MORE DRINKS IN A DAY: 0
HAVE YOU EVER FELT YOU SHOULD CUT DOWN ON YOUR DRINKING: NO
AVERAGE NUMBER OF DAYS PER WEEK YOU HAVE A DRINK CONTAINING ALCOHOL: 2
ALCOHOL_USE: YES
EVER HAD A DRINK FIRST THING IN THE MORNING TO STEADY YOUR NERVES TO GET RID OF A HANGOVER: NO
HAVE PEOPLE ANNOYED YOU BY CRITICIZING YOUR DRINKING: NO
CONSUMPTION TOTAL: NEGATIVE
EVER FELT BAD OR GUILTY ABOUT YOUR DRINKING: NO
ON A TYPICAL DAY WHEN YOU DRINK ALCOHOL HOW MANY DRINKS DO YOU HAVE: 2

## 2022-03-06 ASSESSMENT — ENCOUNTER SYMPTOMS
CHILLS: 0
BLURRED VISION: 0
DEPRESSION: 0
VOMITING: 1
ABDOMINAL PAIN: 1
FEVER: 0
HEADACHES: 0
SHORTNESS OF BREATH: 0
DIZZINESS: 0
CONSTIPATION: 1
MYALGIAS: 0
NAUSEA: 1

## 2022-03-06 ASSESSMENT — COGNITIVE AND FUNCTIONAL STATUS - GENERAL
SUGGESTED CMS G CODE MODIFIER DAILY ACTIVITY: CJ
CLIMB 3 TO 5 STEPS WITH RAILING: A LITTLE
DAILY ACTIVITIY SCORE: 22
WALKING IN HOSPITAL ROOM: A LITTLE
MOBILITY SCORE: 22
SUGGESTED CMS G CODE MODIFIER MOBILITY: CJ
HELP NEEDED FOR BATHING: A LITTLE
DRESSING REGULAR LOWER BODY CLOTHING: A LITTLE

## 2022-03-06 ASSESSMENT — PATIENT HEALTH QUESTIONNAIRE - PHQ9
2. FEELING DOWN, DEPRESSED, IRRITABLE, OR HOPELESS: NOT AT ALL
1. LITTLE INTEREST OR PLEASURE IN DOING THINGS: NOT AT ALL
SUM OF ALL RESPONSES TO PHQ9 QUESTIONS 1 AND 2: 0

## 2022-03-06 NOTE — ED PROVIDER NOTES
ED Provider Note    CHIEF COMPLAINT  Chief Complaint   Patient presents with   • Abdominal Pain     Had a sigmoid resection on Tuesday, only taking tylenol for pain   • N/V   • Constipation     Has not passed gas or had a BM since surgery   • Post-Op Complications       HPI  Carmela Galloway is a 61 y.o. female who presents for evaluation of abdominal pain along with nausea and vomiting.  Patient has also been constipated.  The patient underwent a rectosigmoid colon resection for newly diagnosed cancer on 3/1/2022 by Dr. COCHRAN.  Patient complains of diffuse abdominal pain mostly in the upper abdomen with nausea and vomiting mostly last night.  She has not had a bowel movement a couple of days.  She denies any: Fever, cough, hematemesis, melena hematochezia.  No other acute symptomatology or complaints.    REVIEW OF SYSTEMS  See HPI for further details. All other systems negative.    PAST MEDICAL HISTORY  Past Medical History:   Diagnosis Date   • Cancer (HCC) 2020    breast ([facility])   • Cataract 2019    IOL bilat       FAMILY HISTORY  Family History   Problem Relation Age of Onset   • Arthritis Mother    • Cancer Mother    • Stroke Mother    • Cancer Maternal Grandmother    • Parkinson's Disease Father        SOCIAL HISTORY  Resides locally;    SURGICAL HISTORY  Past Surgical History:   Procedure Laterality Date   • CT LAP,SURG,COLECTOMY,W/ANAST N/A 3/1/2022    Procedure: RESECTION, LOW ANTERIOR, ROBOT-ASSISTED, LAPAROSCOPIC, USING DA MICAELA XI;  Surgeon: Israel Cochran M.D.;  Location: SURGERY [facility];  Service: General   • LUMPECTOMY Left 2020   • EYE SURGERY      cataract    • GYN SURGERY      D&C       CURRENT MEDICATIONS  Home Medications     Reviewed by Joanna Pedro R.N. (Registered Nurse) on 03/06/22 at 1052  Med List Status: Partial   Medication Last Dose Status   acetaminophen (TYLENOL) 500 MG Tab 3/6/2022 Active   COD LIVER OIL PO  Active   oxyCODONE-acetaminophen (PERCOCET) 5-325 MG Tab not  "taking Active   VITAMIN D PO  Active                ALLERGIES  Allergies   Allergen Reactions   • Quinolones Unspecified     Dizzy with nalidixic acid         PHYSICAL EXAM  VITAL SIGNS: /78   Pulse 97   Temp 36.8 °C (98.2 °F) (Oral)   Resp 15   Ht 1.626 m (5' 4\")   Wt 56.7 kg (125 lb)   SpO2 91%   BMI 21.46 kg/m²    Constitutional: 61-year-old female, awake, oriented x3  HENT: ,Atraumatic, Bilateral external ears normal, tympanic membranes clear, Oropharynx moist, No oral exudates, Nose normal.   Eyes: PERRL, EOMI, Conjunctiva normal, No discharge.   Neck: Normal range of motion, No tenderness, Supple, No stridor.   Lymphatic: No lymphadenopathy noted.   Cardiovascular: Normal heart rate, Normal rhythm, No murmurs, No rubs, No gallops.   Thorax & Lungs: Normal Equal breath sounds, No respiratory distress, No wheezing, no stridor, no rales. No chest tenderness.   Abdomen: Laparoscopic surgical scars identified with no signs of infection; diffuse nonlocalized tenderness, nondistended, no organomegaly, positive bowel sounds normal in quality. No guarding or rebound.  Skin: Good skin turgor, pink, warm, dry. No rashes, petechiae, purpura. Normal capillary refill.   Back: No tenderness, No CVA tenderness.   Genitalia: External genitalia appear normal, No masses or lesions. No discharge. Nontender  Rectal: Normal appearance, Normal sphincter tone. No external or internal lesions noted. Stool is normal color and heme negative.   Extremities: Intact distal pulses, No edema, No tenderness, No cyanosis, No clubbing. Vascular: Pulses are 2+, symmetric in the upper and lower extremities.  Musculoskeletal: Good range of motion in all major joints. No tenderness to palpation or major deformities noted.   Neurologic: Alert & oriented x 3, Normal motor function, Normal sensory function, No gross focal deficits noted.   Psychiatric: Affect normal, Judgment normal, Mood normal. "     RADIOLOGY/PROCEDURES  CT-ABDOMEN-PELVIS WITH   Final Result      1.  Mildly dilated loops of small intestine with bowel wall thickening within the left lower quadrant. The colon is also decompressed. This may represent presence of ileus versus early partial small bowel obstruction. The bowel wall thickening could also    potentially represent presence of enteritis.      2.  Small amount of free fluid and a small amount of free air within the upper abdomen and within the pelvis likely related to recent surgical procedure.      3.  Recent rectosigmoid anastomosis.      4.  Large hepatic cyst.      5.  Bibasilar atelectasis.            COURSE & MEDICAL DECISION MAKING  Pertinent Labs & Imaging studies reviewed. (See chart for details)  1.  Lactated Ringer's at 250 cc/h  2.  Zofran, titrated    Laboratory studies: CBC shows white count of 9.6, 86% neutrophils, 8% lymphocytes, 4% monocytes, hemoglobin 16.7, hematocrit 49.7; CMP shows a glucose of 119, AST 70, , otherwise within normal limits; lipase 25;    Discussion/consultation: At this time, the patient presents for evaluation of abdominal pain and vomiting.  Patient has findings consistent with an adynamic ileus.  She has dehydration associated with this.  I spoke with general surgery on-call also spoke with the hospitalist on-call.  Patient will be admitted for further monitoring, treatment, and care.    FINAL IMPRESSION  1. Epigastric pain    2. Dehydration    3. Ileus (HCC)           PLAN  1.  The patient will be admitted for further monitoring, treatment, and care.    Electronically signed by: Guy G Gansert, M.D., 3/6/2022 11:13 AM

## 2022-03-06 NOTE — ED TRIAGE NOTES
Ambulates to triage  Chief Complaint   Patient presents with   • Abdominal Pain     Had a sigmoid resection on Tuesday, only taking tylenol for pain   • N/V   • Constipation     Has not passed gas or had a BM since surgery   • Post-Op Complications     Last took tylenol this morning at 5am, has not been taking narcotics. Started vomiting this morning.

## 2022-03-06 NOTE — H&P
Hospital Medicine History & Physical Note    Date of Service  3/6/2022    Primary Care Physician  Van Fajardo M.D.    Consultants  general surgery    Specialist Names: Dr. Rosado    Code Status  Full Code    Chief Complaint  Chief Complaint   Patient presents with   • Abdominal Pain     Had a sigmoid resection on Tuesday, only taking tylenol for pain   • N/V   • Constipation     Has not passed gas or had a BM since surgery   • Post-Op Complications       History of Presenting Illness  Carmela Galloway is a 61 y.o. female who presented 3/6/2022 with abdominal pain.  Patient underwent rectosigmoid colon resection for newly diagnosed cancer on 3/1/2022 by Dr. Gamboa.  She does report having a bowel movement after surgery and tolerating a diet however since then in the last few days she is been constipated and as of last night has also had nausea and vomiting for most of the night.  Patient denies overt abdominal pain but states that she feels bloated, has been taking Tylenol as needed at home.  Patient denies any fevers, chills, shortness of breath or chest pain.  Patient also reports some difficulty with urination, denies dysuria.     In the ER patient initially tachycardic, labs show elevated hemoglobin and negative UA.  CT abdomen pelvis showed mildly dilated loops of small intestine with bowel thickening within the left lower quadrant may represent ileus versus early partial small bowel obstruction.  General surgery consulted by the ER.    I discussed the plan of care with patient and family.    Review of Systems  Review of Systems   Constitutional: Negative for chills and fever.   HENT: Negative for congestion.    Eyes: Negative for blurred vision.   Respiratory: Negative for shortness of breath.    Cardiovascular: Negative for chest pain.   Gastrointestinal: Positive for abdominal pain, constipation, nausea and vomiting.   Genitourinary: Negative for dysuria.   Musculoskeletal: Negative for  myalgias.   Skin: Negative for rash.   Neurological: Negative for dizziness and headaches.   Psychiatric/Behavioral: Negative for depression.   All other systems reviewed and are negative.      Past Medical History   has a past medical history of Cancer (HCC) (2020) and Cataract (2019).    Surgical History   has a past surgical history that includes lumpectomy (Left, 2020); eye surgery; gyn surgery; and pr lap,surg,colectomy,w/anast (N/A, 3/1/2022).     Family History  family history includes Arthritis in her mother; Cancer in her maternal grandmother and mother; Parkinson's Disease in her father; Stroke in her mother.   Family history reviewed with patient. There is no family history that is pertinent to the chief complaint.     Social History   reports that she has never smoked. She has never used smokeless tobacco. She reports current alcohol use. She reports that she does not use drugs.    Allergies  Allergies   Allergen Reactions   • Quinolones Unspecified     Dizzy with nalidixic acid         Medications  Prior to Admission Medications   Prescriptions Last Dose Informant Patient Reported? Taking?   COD LIVER OIL PO  Patient Yes No   Sig: Take 1 Tablet by mouth every day.   VITAMIN D PO  Patient Yes No   Sig: Take 1 Tablet by mouth every day.   acetaminophen (TYLENOL) 500 MG Tab 3/6/2022 at 0500  Yes Yes   Sig: Take 500-1,000 mg by mouth every 6 hours as needed.   oxyCODONE-acetaminophen (PERCOCET) 5-325 MG Tab not taking  No No   Sig: Take 1 Tablet by mouth every four hours as needed (pain) for up to 7 days.      Facility-Administered Medications: None       Physical Exam  Temp:  [36.8 °C (98.2 °F)] 36.8 °C (98.2 °F)  Pulse:  [] 85  Resp:  [15-20] 20  BP: (123-141)/(67-78) 126/72  SpO2:  [91 %-97 %] 95 %  Blood Pressure: 123/67   Temperature: 36.8 °C (98.2 °F)   Pulse: 99   Respiration: 20   Pulse Oximetry: 95 %       Physical Exam  Vitals and nursing note reviewed.   Constitutional:       General: She  is not in acute distress.     Appearance: Normal appearance.   HENT:      Head: Normocephalic and atraumatic.      Right Ear: External ear normal.      Left Ear: External ear normal.      Nose: Nose normal.      Mouth/Throat:      Pharynx: Oropharynx is clear.   Eyes:      Extraocular Movements: Extraocular movements intact.      Conjunctiva/sclera: Conjunctivae normal.      Pupils: Pupils are equal, round, and reactive to light.   Cardiovascular:      Rate and Rhythm: Normal rate and regular rhythm.      Pulses: Normal pulses.      Heart sounds: Normal heart sounds.   Pulmonary:      Effort: Pulmonary effort is normal. No respiratory distress.      Breath sounds: Normal breath sounds. No wheezing or rales.   Abdominal:      General: Abdomen is flat. There is no distension.      Palpations: Abdomen is soft.      Tenderness: There is abdominal tenderness.      Comments: Surgical incisions intact no erythema    Musculoskeletal:         General: Normal range of motion.      Cervical back: Normal range of motion and neck supple.      Right lower leg: No edema.      Left lower leg: No edema.   Skin:     General: Skin is warm and dry.   Neurological:      General: No focal deficit present.      Mental Status: She is alert and oriented to person, place, and time.      Cranial Nerves: No cranial nerve deficit.      Sensory: No sensory deficit.      Motor: No weakness.   Psychiatric:         Mood and Affect: Mood normal.         Behavior: Behavior normal.         Laboratory:  Recent Labs     03/06/22  1142   WBC 9.6   RBC 5.30   HEMOGLOBIN 16.7*   HEMATOCRIT 49.7*   MCV 93.8   MCH 31.5   MCHC 33.6   RDW 43.4   PLATELETCT 228   MPV 10.3     Recent Labs     03/06/22  1142   SODIUM 138   POTASSIUM 4.7   CHLORIDE 102   CO2 21   GLUCOSE 119*   BUN 15   CREATININE 0.80   CALCIUM 9.8     Recent Labs     03/06/22  1142   ALTSGPT 146*   ASTSGOT 70*   ALKPHOSPHAT 75   TBILIRUBIN 0.3   LIPASE 25   GLUCOSE 119*         No results for  input(s): NTPROBNP in the last 72 hours.      No results for input(s): TROPONINT in the last 72 hours.    Imaging:  CT-ABDOMEN-PELVIS WITH   Final Result      1.  Mildly dilated loops of small intestine with bowel wall thickening within the left lower quadrant. The colon is also decompressed. This may represent presence of ileus versus early partial small bowel obstruction. The bowel wall thickening could also    potentially represent presence of enteritis.      2.  Small amount of free fluid and a small amount of free air within the upper abdomen and within the pelvis likely related to recent surgical procedure.      3.  Recent rectosigmoid anastomosis.      4.  Large hepatic cyst.      5.  Bibasilar atelectasis.          no X-Ray or EKG requiring interpretation    Assessment/Plan:  I anticipate this patient will require at least two midnights for appropriate medical management, necessitating inpatient admission.    * Ileus following gastrointestinal surgery (HCC)- (present on admission)  Assessment & Plan  W/ nausea and vomiting   CT shows ileus versus early partial small bowel obstruction  General surgery consulted  NPO  IV fluids  Supportive care    Adenocarcinoma (HCC)- (present on admission)  Assessment & Plan  Noted on pathology of the colon from resection   Follow up with outpatient oncology     Polycythemia- (present on admission)  Assessment & Plan  Likely 2/2 dehydration from the nausea and vomiting  IV fluids  Repeat CBC in a.m.    Transaminitis- (present on admission)  Assessment & Plan  Fatty change in the liver on CT with large lobulated right lobe hepatic cyst  Repeat CMP in the morning      VTE prophylaxis: SCDs/TEDs and enoxaparin ppx

## 2022-03-06 NOTE — ED NOTES
Patient resting in bed and in mild distress at this time. VSS.  at bedside. Call light within reach.

## 2022-03-06 NOTE — ED NOTES
Confirmed and verified with  that it is ok to go ahead with the CT scan of abdomen for patient with only 50% of oral contrast ingested. CT scan techs aware.

## 2022-03-06 NOTE — ED NOTES
Med rec updated and complete. Allergies reviewed. Confirmed name and date of birth.   Pt denies oral antibiotic use in last 30 days.      Home pharmacy [facility] [phone]

## 2022-03-06 NOTE — ED NOTES
Patient ambulated to the bathroom and reports still feeling nauseous at this time time but does not want antiemetics until CT results and consult from surgery is done.

## 2022-03-07 LAB
ALBUMIN SERPL BCP-MCNC: 3.1 G/DL (ref 3.2–4.9)
ALBUMIN/GLOB SERPL: 1.4 G/DL
ALP SERPL-CCNC: 54 U/L (ref 30–99)
ALT SERPL-CCNC: 84 U/L (ref 2–50)
ANION GAP SERPL CALC-SCNC: 12 MMOL/L (ref 7–16)
AST SERPL-CCNC: 33 U/L (ref 12–45)
BILIRUB SERPL-MCNC: 0.3 MG/DL (ref 0.1–1.5)
BUN SERPL-MCNC: 14 MG/DL (ref 8–22)
CALCIUM SERPL-MCNC: 8.8 MG/DL (ref 8.5–10.5)
CHLORIDE SERPL-SCNC: 102 MMOL/L (ref 96–112)
CO2 SERPL-SCNC: 23 MMOL/L (ref 20–33)
CREAT SERPL-MCNC: 0.66 MG/DL (ref 0.5–1.4)
ERYTHROCYTE [DISTWIDTH] IN BLOOD BY AUTOMATED COUNT: 43.8 FL (ref 35.9–50)
GLOBULIN SER CALC-MCNC: 2.2 G/DL (ref 1.9–3.5)
GLUCOSE SERPL-MCNC: 105 MG/DL (ref 65–99)
HCT VFR BLD AUTO: 40.4 % (ref 37–47)
HGB BLD-MCNC: 13.6 G/DL (ref 12–16)
MCH RBC QN AUTO: 31.6 PG (ref 27–33)
MCHC RBC AUTO-ENTMCNC: 33.7 G/DL (ref 33.6–35)
MCV RBC AUTO: 94 FL (ref 81.4–97.8)
PLATELET # BLD AUTO: 211 K/UL (ref 164–446)
PMV BLD AUTO: 10.4 FL (ref 9–12.9)
POTASSIUM SERPL-SCNC: 3.9 MMOL/L (ref 3.6–5.5)
PROT SERPL-MCNC: 5.3 G/DL (ref 6–8.2)
RBC # BLD AUTO: 4.3 M/UL (ref 4.2–5.4)
SODIUM SERPL-SCNC: 137 MMOL/L (ref 135–145)
WBC # BLD AUTO: 7.8 K/UL (ref 4.8–10.8)

## 2022-03-07 PROCEDURE — 700105 HCHG RX REV CODE 258: Performed by: INTERNAL MEDICINE

## 2022-03-07 PROCEDURE — 700111 HCHG RX REV CODE 636 W/ 250 OVERRIDE (IP): Performed by: INTERNAL MEDICINE

## 2022-03-07 PROCEDURE — 80053 COMPREHEN METABOLIC PANEL: CPT

## 2022-03-07 PROCEDURE — 99233 SBSQ HOSP IP/OBS HIGH 50: CPT | Performed by: HOSPITALIST

## 2022-03-07 PROCEDURE — 700101 HCHG RX REV CODE 250: Performed by: SURGERY

## 2022-03-07 PROCEDURE — 770001 HCHG ROOM/CARE - MED/SURG/GYN PRIV*

## 2022-03-07 PROCEDURE — 85027 COMPLETE CBC AUTOMATED: CPT

## 2022-03-07 PROCEDURE — 36415 COLL VENOUS BLD VENIPUNCTURE: CPT

## 2022-03-07 RX ORDER — DEXTROSE MONOHYDRATE, SODIUM CHLORIDE, AND POTASSIUM CHLORIDE 50; 1.49; 4.5 G/1000ML; G/1000ML; G/1000ML
INJECTION, SOLUTION INTRAVENOUS CONTINUOUS
Status: DISCONTINUED | OUTPATIENT
Start: 2022-03-07 | End: 2022-03-09 | Stop reason: HOSPADM

## 2022-03-07 RX ADMIN — ONDANSETRON 4 MG: 4 TABLET, ORALLY DISINTEGRATING ORAL at 00:24

## 2022-03-07 RX ADMIN — POTASSIUM CHLORIDE, DEXTROSE MONOHYDRATE AND SODIUM CHLORIDE: 150; 5; 450 INJECTION, SOLUTION INTRAVENOUS at 11:17

## 2022-03-07 RX ADMIN — ENOXAPARIN SODIUM 40 MG: 40 INJECTION SUBCUTANEOUS at 05:01

## 2022-03-07 RX ADMIN — SODIUM CHLORIDE, POTASSIUM CHLORIDE, SODIUM LACTATE AND CALCIUM CHLORIDE: 600; 310; 30; 20 INJECTION, SOLUTION INTRAVENOUS at 03:41

## 2022-03-07 ASSESSMENT — ENCOUNTER SYMPTOMS
MYALGIAS: 1
CHILLS: 0
FEVER: 0

## 2022-03-07 ASSESSMENT — PAIN DESCRIPTION - PAIN TYPE: TYPE: SURGICAL PAIN

## 2022-03-07 NOTE — CARE PLAN
The patient is Stable - Low risk of patient condition declining or worsening         Progress made toward(s) clinical / shift goals: pain control, rest, advance diet       Problem: Knowledge Deficit - Standard  Goal: Patient and family/care givers will demonstrate understanding of plan of care, disease process/condition, diagnostic tests and medications  Outcome: Progressing

## 2022-03-07 NOTE — ASSESSMENT & PLAN NOTE
Noted on pathology of the colon from resection, stage III with clear margins  Follow up with outpatient oncology, her oncologist is in [place]

## 2022-03-07 NOTE — PROGRESS NOTES
Report received from RN, assumed care at 0700  Pt is A0X4, and responds appropriately   Pt declines any SOB, chest pain, new onset of numbness/ tingiling  Pt rates pain at 0/10, on a scale of 1-10, pt declines pain and pain medication needs at this time   Pt is voiding adequatly and without hesitancy  Pt has + flatus, + bowel sounds, + BM on 3/7/2022  Pt ambulates with a steady gait up self   Pt is tolerating a full liquid no diary  diet, pt denies any nausea/vomiting  Pt has x4 old healing lap sites to abdomen from pervious surgery         Plan of care discussed, all questions answered. Explained importance of calling before getting OOB and pt verbalizes understanding. Explained importance of oral care. Call light is within reach, treaded slipper socks on, bed in lowest/ locked position, hourly rounding in place, all needs met at this time

## 2022-03-07 NOTE — CONSULTS
CONSULT NOTE    PATIENT ID  Name:             Carmela Galloway   YOB: 1960  Age:                 61 y.o.  female   MRN:               [id number]    CHIEF COMPLAINT:        Crampy abdominal pain, nausea vomiting    HISTORY OF PRESENT ILLNESS:  61-year-old female, postop robotic low anterior resection on March 1, 2022 by Dr. Gamboa for colon cancer.  The patient called earlier today with difficulty passing gas and bowel movements.  She was also complaining of crampy abdominal pain and associated nausea and vomiting.  She denies any constant abdominal pain.  No blood in the stools.  Denies any fever or chills.    REVIEW OF SYSTEMS:   Constitutional: Denies unintended weight change, night sweats, fatigue  Eyes:   Denies eye pain, redness, discharge, vision changes  Ears/Nose/Throat/Mouth: Denies hearing changes, ear pain, nasal congestion, sore throat, dysphagia  Cardiovascular:  Denies Chest pain, shortness of breath, orthopnea, palpitations, claudication  Respiratory:  Denies cough, sputum, wheezing, dyspnea  Gastrointestinal/Hepatic:  Denies dysphagia, melena, jaundice, hematochezia, changing heartburn  Genitourinary:  Denies dysuria, increased frequency, hematuria, urgency  Musculoskeletal/Rheum: Denies changing arthralgias, myalgias, joint swelling, joint stiffness  Skin/Breast: Denies changing skin lesions, pruritis, nipple discharge, hair changes  Neurological: Denies weakness, numbness, paresthesia, syncope, dizziness  Pyschiatric: Denies acute depression, anxiety, insomnia, personality changes, delusions  Endocrine: Denies temperature intolerance, polydipsia, polyuria  Heme/Oncology/Lymph Nodes: Denies easy bruising, bleeding, lymphadenopathy  All other systems were reviewed and are negative                 Past Medical History:   Past Medical History:   Diagnosis Date   • Cancer (HCC) 2020    breast ([facility])   • Cataract 2019    IOL bilat       Past Surgical History:  Past Surgical  History:   Procedure Laterality Date   • ND LAP,SURG,COLECTOMY,W/ANAST N/A 3/1/2022    Procedure: RESECTION, LOW ANTERIOR, ROBOT-ASSISTED, LAPAROSCOPIC, USING DA MICAELA XI;  Surgeon: Israel Gamboa M.D.;  Location: SURGERY [facility];  Service: General   • LUMPECTOMY Left 2020   • EYE SURGERY      cataract    • GYN SURGERY      D&C       Current Outpatient Medications:  No current facility-administered medications on file prior to encounter.     Current Outpatient Medications on File Prior to Encounter   Medication Sig Dispense Refill   • acetaminophen (TYLENOL) 500 MG Tab Take 500-1,000 mg by mouth every 6 hours as needed for Mild Pain.     • VITAMIN D PO Take 1 Tablet by mouth every day.     • COD LIVER OIL PO Take 1 Capsule by mouth every day.         Current Inpatient Medications:   Current Facility-Administered Medications   Medication Last Admin   • LR infusion (continuous) Rate Change at 03/06/22 1623   • [START ON 3/7/2022] enoxaparin (LOVENOX) inj 40 mg     • acetaminophen (Tylenol) tablet 650 mg     • ondansetron (ZOFRAN) syringe/vial injection 4 mg 4 mg at 03/06/22 1810   • ondansetron (ZOFRAN ODT) dispertab 4 mg     • promethazine (PHENERGAN) tablet 12.5-25 mg     • promethazine (PHENERGAN) suppository 12.5-25 mg     • prochlorperazine (COMPAZINE) injection 5-10 mg     • ketorolac (TORADOL) injection 30 mg         Medication Allergy/Sensitivities:  Allergies   Allergen Reactions   • Quinolones Unspecified     Dizzy with nalidixic acid         Family History:  Family History   Problem Relation Age of Onset   • Arthritis Mother    • Cancer Mother    • Stroke Mother    • Cancer Maternal Grandmother    • Parkinson's Disease Father        Social History:  PCP: Van Fajardo M.D.  Social History     Tobacco Use   Smoking Status Never Smoker   Smokeless Tobacco Never Used     Social History     Substance and Sexual Activity   Alcohol Use Yes    Comment: 1-2 glasses of wine per week     Social  History     Substance and Sexual Activity   Drug Use Never       PHYSICAL EXAM:  Weight/BMI: Body mass index is 21.46 kg/m².  Vitals:    03/06/22 1602 03/06/22 1643 03/06/22 1818 03/06/22 1913   BP: 126/72  136/74 126/73   Pulse: 85 78 91 80   Resp: 20 14 20 17   Temp:  36.3 °C (97.3 °F)  36.8 °C (98.2 °F)   TempSrc:  Temporal  Temporal   SpO2: 95% 94% 94% 96%   Weight:       Height:         Oxygen Therapy:  Pulse Oximetry: 96 %, O2 Delivery Device: None - Room Air    Constitutional: Well developed, Well nourished, No acute distress, Non-toxic appearance.    HENMT: Normocephalic, Atraumatic, Bilateral external ears normal, Oropharynx moist mucous membranes, No oral exudates, Nose normal.  No thyromegaly.   Eyes: PERRLA, EOMI, Conjunctiva normal, No discharge.   Neck: Normal range of motion, No cervical tenderness, Supple, No stridor, no JVD.  Cardiovascular: Normal heart rate, Normal rhythm, No murmurs, No rubs, No gallops.   Extremites with intact distal pulses, no cyanosis, clubbing or edema.   Lungs:  Respiratory effort is normal. Normal breath sounds, breath sounds clear to auscultation bilaterally,  no rales, no rhonchi, no wheezing.   Abdomen: Bowel sounds normal, Soft, No tenderness, mild distention, incisions clean dry and intact, no guarding, No rebound, No masses, No hepatosplenomegaly.  Skin: Warm, Dry, No erythema, No rash, no induration or crepitus.        Neurologic: Alert & oriented x 3, Normal motor function, Normal sensory function, No focal deficits noted, cranial nerves II through XII are normal.  Psychiatric: Affect normal, Judgment normal, Mood normal.     LAB DATA REVIEWED:  Recent Results (from the past 24 hour(s))   CBC WITH DIFFERENTIAL    Collection Time: 03/06/22 11:42 AM   Result Value Ref Range    WBC 9.6 4.8 - 10.8 K/uL    RBC 5.30 4.20 - 5.40 M/uL    Hemoglobin 16.7 (H) 12.0 - 16.0 g/dL    Hematocrit 49.7 (H) 37.0 - 47.0 %    MCV 93.8 81.4 - 97.8 fL    MCH 31.5 27.0 - 33.0 pg    MCHC  33.6 33.6 - 35.0 g/dL    RDW 43.4 35.9 - 50.0 fL    Platelet Count 228 164 - 446 K/uL    MPV 10.3 9.0 - 12.9 fL    Neutrophils-Polys 86.10 (H) 44.00 - 72.00 %    Lymphocytes 8.40 (L) 22.00 - 41.00 %    Monocytes 4.60 0.00 - 13.40 %    Eosinophils 0.20 0.00 - 6.90 %    Basophils 0.30 0.00 - 1.80 %    Immature Granulocytes 0.40 0.00 - 0.90 %    Nucleated RBC 0.00 /100 WBC    Neutrophils (Absolute) 8.25 (H) 2.00 - 7.15 K/uL    Lymphs (Absolute) 0.81 (L) 1.00 - 4.80 K/uL    Monos (Absolute) 0.44 0.00 - 0.85 K/uL    Eos (Absolute) 0.02 0.00 - 0.51 K/uL    Baso (Absolute) 0.03 0.00 - 0.12 K/uL    Immature Granulocytes (abs) 0.04 0.00 - 0.11 K/uL    NRBC (Absolute) 0.00 K/uL   COMP METABOLIC PANEL    Collection Time: 03/06/22 11:42 AM   Result Value Ref Range    Sodium 138 135 - 145 mmol/L    Potassium 4.7 3.6 - 5.5 mmol/L    Chloride 102 96 - 112 mmol/L    Co2 21 20 - 33 mmol/L    Anion Gap 15.0 7.0 - 16.0    Glucose 119 (H) 65 - 99 mg/dL    Bun 15 8 - 22 mg/dL    Creatinine 0.80 0.50 - 1.40 mg/dL    Calcium 9.8 8.5 - 10.5 mg/dL    AST(SGOT) 70 (H) 12 - 45 U/L    ALT(SGPT) 146 (H) 2 - 50 U/L    Alkaline Phosphatase 75 30 - 99 U/L    Total Bilirubin 0.3 0.1 - 1.5 mg/dL    Albumin 4.2 3.2 - 4.9 g/dL    Total Protein 7.4 6.0 - 8.2 g/dL    Globulin 3.2 1.9 - 3.5 g/dL    A-G Ratio 1.3 g/dL   LIPASE    Collection Time: 03/06/22 11:42 AM   Result Value Ref Range    Lipase 25 11 - 82 U/L   ESTIMATED GFR    Collection Time: 03/06/22 11:42 AM   Result Value Ref Range    GFR If African American >60 >60 mL/min/1.73 m 2    GFR If Non African American >60 >60 mL/min/1.73 m 2   URINALYSIS (UA)    Collection Time: 03/06/22  1:22 PM    Specimen: Urine, Cath   Result Value Ref Range    Color Yellow     Character Turbid (A)     Specific Gravity 1.032 <1.035    Ph 6.0 5.0 - 8.0    Glucose Negative Negative mg/dL    Ketones 15 (A) Negative mg/dL    Protein Negative Negative mg/dL    Bilirubin Negative Negative    Urobilinogen, Urine 0.2  Negative    Nitrite Negative Negative    Leukocyte Esterase Negative Negative    Occult Blood Trace (A) Negative    Micro Urine Req Microscopic    URINE MICROSCOPIC (W/UA)    Collection Time: 03/06/22  1:22 PM   Result Value Ref Range    WBC 0-2 /hpf    RBC 0-2 /hpf    Bacteria Negative None /hpf    Epithelial Cells Negative /hpf    Ca Oxalate Crystal Many /hpf    Hyaline Cast 0-2 /lpf       IMAGING:   Images Independently Reviewed   CT-ABDOMEN-PELVIS WITH   Final Result      1.  Mildly dilated loops of small intestine with bowel wall thickening within the left lower quadrant. The colon is also decompressed. This may represent presence of ileus versus early partial small bowel obstruction. The bowel wall thickening could also    potentially represent presence of enteritis.      2.  Small amount of free fluid and a small amount of free air within the upper abdomen and within the pelvis likely related to recent surgical procedure.      3.  Recent rectosigmoid anastomosis.      4.  Large hepatic cyst.      5.  Bibasilar atelectasis.          ASSESSMENT/PLAN     61-year-old female, 5 days postop from robotic low anterior resection for colon cancer at the rectosigmoid junction by Dr. Gamboa.  The patient is presenting with what appears to be a postoperative ileus.  No concerning findings for bowel compromise or anastomotic leak.  Patient will be admitted for conservative management with bowel rest, IV fluid rehydration.  If she has persistent nausea and vomiting then an NG tube will be inserted to low intermittent suction.  Dr. Gamboa will return tomorrow and resume care.    Nesha Rosado MD  General Surgery  Colon and Rectal Surgery  [facility]

## 2022-03-07 NOTE — ASSESSMENT & PLAN NOTE
Clinically improving, good bowel sounds on exam, minimal flatus   CT shows ileus versus early partial small bowel obstruction  General surgery consulted -- reviewed pathology with patient  Continue IV fluids, but reduce rate  Advance diet further  Supportive care

## 2022-03-07 NOTE — ED NOTES
Patient laying in bed and denies any needs at this time; no active vomiting at this time and vital signs remain stable. Patient's  at bedside. Call light within reach.

## 2022-03-07 NOTE — PROGRESS NOTES
"Hospital Medicine Daily Progress Note    Date of Service  3/7/2022    Chief Complaint  Carmela Galloway is a 61 y.o. female admitted 3/6/2022 with worsening nausea and vomiting    Hospital Course  Per HPI:  \"Carmela Galloway is a 61 y.o. female who presented 3/6/2022 with abdominal pain.  Patient underwent rectosigmoid colon resection for newly diagnosed cancer on 3/1/2022 by Dr. Gamboa.  She does report having a bowel movement after surgery and tolerating a diet however since then in the last few days she is been constipated and as of last night has also had nausea and vomiting for most of the night.  Patient denies overt abdominal pain but states that she feels bloated, has been taking Tylenol as needed at home.  Patient denies any fevers, chills, shortness of breath or chest pain.  Patient also reports some difficulty with urination, denies dysuria.      In the ER patient initially tachycardic, labs show elevated hemoglobin and negative UA.  CT abdomen pelvis showed mildly dilated loops of small intestine with bowel thickening within the left lower quadrant may represent ileus versus early partial small bowel obstruction.  General surgery consulted by the ER.\"    Interval Problem Update  Pathology stage III Colon Cancer with clear margins     I have personally seen and examined the patient at bedside. I discussed the plan of care with patient and family.    Consultants/Specialty  general surgery    Code Status  Full Code    Disposition  Patient is not medically cleared for discharge.   Anticipate discharge to to home with close outpatient follow-up.  I have placed the appropriate orders for post-discharge needs.    Review of Systems  Review of Systems   Constitutional: Negative for chills and fever.   Musculoskeletal: Positive for myalgias.        Physical Exam  Temp:  [36.3 °C (97.3 °F)-36.8 °C (98.2 °F)] 36.5 °C (97.7 °F)  Pulse:  [78-99] 80  Resp:  [14-20] 17  BP: (110-141)/(67-74) 110/67  SpO2:  [93 %-96 " %] 94 %    Physical Exam  Vitals and nursing note reviewed.   Constitutional:       General: She is not in acute distress.     Appearance: She is not ill-appearing.   HENT:      Head: Normocephalic and atraumatic.      Nose: No congestion.      Mouth/Throat:      Mouth: Mucous membranes are dry.      Pharynx: Oropharynx is clear.   Eyes:      General:         Right eye: No discharge.         Left eye: No discharge.      Extraocular Movements: Extraocular movements intact.      Conjunctiva/sclera: Conjunctivae normal.   Cardiovascular:      Rate and Rhythm: Normal rate.      Pulses: Normal pulses.      Heart sounds: Normal heart sounds.   Pulmonary:      Effort: Pulmonary effort is normal. No respiratory distress.      Breath sounds: Normal breath sounds. No wheezing.   Abdominal:      General: Bowel sounds are normal. There is no distension.      Palpations: Abdomen is soft.      Tenderness: There is no abdominal tenderness.   Musculoskeletal:      Cervical back: Normal range of motion and neck supple.   Skin:     General: Skin is warm and dry.   Neurological:      General: No focal deficit present.      Mental Status: She is alert and oriented to person, place, and time.      Cranial Nerves: No cranial nerve deficit.   Psychiatric:         Mood and Affect: Mood normal.         Thought Content: Thought content normal.         Fluids    Intake/Output Summary (Last 24 hours) at 3/7/2022 1205  Last data filed at 3/7/2022 0315  Gross per 24 hour   Intake 0 ml   Output 0 ml   Net 0 ml       Laboratory  Recent Labs     03/06/22  1142 03/07/22  0159   WBC 9.6 7.8   RBC 5.30 4.30   HEMOGLOBIN 16.7* 13.6   HEMATOCRIT 49.7* 40.4   MCV 93.8 94.0   MCH 31.5 31.6   MCHC 33.6 33.7   RDW 43.4 43.8   PLATELETCT 228 211   MPV 10.3 10.4     Recent Labs     03/06/22  1142 03/07/22  0159   SODIUM 138 137   POTASSIUM 4.7 3.9   CHLORIDE 102 102   CO2 21 23   GLUCOSE 119* 105*   BUN 15 14   CREATININE 0.80 0.66   CALCIUM 9.8 8.8                    Imaging  CT-ABDOMEN-PELVIS WITH   Final Result      1.  Mildly dilated loops of small intestine with bowel wall thickening within the left lower quadrant. The colon is also decompressed. This may represent presence of ileus versus early partial small bowel obstruction. The bowel wall thickening could also    potentially represent presence of enteritis.      2.  Small amount of free fluid and a small amount of free air within the upper abdomen and within the pelvis likely related to recent surgical procedure.      3.  Recent rectosigmoid anastomosis.      4.  Large hepatic cyst.      5.  Bibasilar atelectasis.           Assessment/Plan  * Ileus following gastrointestinal surgery (HCC)- (present on admission)  Assessment & Plan  W/ nausea and vomiting   CT shows ileus versus early partial small bowel obstruction  General surgery consulted -- reviewed pathology with patient  Advancing diet, patient requesting lactose free meals  Continue IV fluids  Supportive care    Adenocarcinoma (HCC)- (present on admission)  Assessment & Plan  Noted on pathology of the colon from resection, stage III with clear margins  Follow up with outpatient oncology, her oncologist is in [place]    Polycythemia- (present on admission)  Assessment & Plan  Likely 2/2 dehydration from the nausea and vomiting  IV fluids  Repeat CBC in a.m.    Transaminitis- (present on admission)  Assessment & Plan  Fatty change in the liver on CT with large lobulated right lobe hepatic cyst       VTE prophylaxis: enoxaparin ppx    I have performed a physical exam and reviewed and updated ROS and Plan today (3/7/2022). In review of yesterday's note (3/6/2022), there are no changes except as documented above.

## 2022-03-07 NOTE — PROGRESS NOTES
3/7  Pt seen and examined, 6 days post-op from [facility] for Colon Cancer.  Started having bowel movements again overnight, feels less nausea then before and wants to try liquids PO toady.  No wound drainage or other complaints at this time.      Exam benign, wounds c/d/i  Recent CT without evidence of anastomotic leak  WBC wnl    Continue current supportive care with IVF.  Ok to restart liquids PO  Discussed pathology results with patient.  Revealed stage III Colon Cancer with clear margins.  Recommend consultation and follow up with oncology on discharge home to discuss potential chemotherapy

## 2022-03-07 NOTE — ED NOTES
Patient seen and evaluated by admitting hospitalist. Vital signs are stable. Call light within reach.

## 2022-03-07 NOTE — PROGRESS NOTES
4 Eyes Skin Assessment Completed by SARITA Vasquez and SARITA Wayne.    Head WDL  Ears WDL  Nose WDL  Mouth WDL  Neck WDL  Breast/Chest WDL  Shoulder Blades WDL  Spine WDL  (R) Arm/Elbow/Hand WDL  (L) Arm/Elbow/Hand WDL  Abdomen Incision- lap sites x4 dermabond.  Old drain site tegaderm/gauze- CDI  Groin WDL  Scrotum/Coccyx/Buttocks WDL  (R) Leg WDL  (L) Leg WDL  (R) Heel/Foot/Toe WDL  (L) Heel/Foot/Toe WDL          Devices In Places N/A      Interventions In Place N/A    Possible Skin Injury No    Pictures Uploaded Into Epic N/A  Wound Consult Placed N/A  RN Wound Prevention Protocol Ordered No

## 2022-03-08 LAB
ANION GAP SERPL CALC-SCNC: 8 MMOL/L (ref 7–16)
BASOPHILS # BLD AUTO: 0.5 % (ref 0–1.8)
BASOPHILS # BLD: 0.03 K/UL (ref 0–0.12)
BUN SERPL-MCNC: 10 MG/DL (ref 8–22)
CALCIUM SERPL-MCNC: 8.6 MG/DL (ref 8.5–10.5)
CHLORIDE SERPL-SCNC: 104 MMOL/L (ref 96–112)
CO2 SERPL-SCNC: 26 MMOL/L (ref 20–33)
CREAT SERPL-MCNC: 0.62 MG/DL (ref 0.5–1.4)
EOSINOPHIL # BLD AUTO: 0.13 K/UL (ref 0–0.51)
EOSINOPHIL NFR BLD: 2 % (ref 0–6.9)
ERYTHROCYTE [DISTWIDTH] IN BLOOD BY AUTOMATED COUNT: 42.9 FL (ref 35.9–50)
GLUCOSE SERPL-MCNC: 109 MG/DL (ref 65–99)
HCT VFR BLD AUTO: 37.7 % (ref 37–47)
HGB BLD-MCNC: 12.7 G/DL (ref 12–16)
IMM GRANULOCYTES # BLD AUTO: 0.03 K/UL (ref 0–0.11)
IMM GRANULOCYTES NFR BLD AUTO: 0.5 % (ref 0–0.9)
LYMPHOCYTES # BLD AUTO: 1.48 K/UL (ref 1–4.8)
LYMPHOCYTES NFR BLD: 22.8 % (ref 22–41)
MCH RBC QN AUTO: 31.8 PG (ref 27–33)
MCHC RBC AUTO-ENTMCNC: 33.7 G/DL (ref 33.6–35)
MCV RBC AUTO: 94.3 FL (ref 81.4–97.8)
MONOCYTES # BLD AUTO: 0.49 K/UL (ref 0–0.85)
MONOCYTES NFR BLD AUTO: 7.6 % (ref 0–13.4)
NEUTROPHILS # BLD AUTO: 4.32 K/UL (ref 2–7.15)
NEUTROPHILS NFR BLD: 66.6 % (ref 44–72)
NRBC # BLD AUTO: 0 K/UL
NRBC BLD-RTO: 0 /100 WBC
PLATELET # BLD AUTO: 224 K/UL (ref 164–446)
PMV BLD AUTO: 10 FL (ref 9–12.9)
POTASSIUM SERPL-SCNC: 3.9 MMOL/L (ref 3.6–5.5)
RBC # BLD AUTO: 4 M/UL (ref 4.2–5.4)
SODIUM SERPL-SCNC: 138 MMOL/L (ref 135–145)
WBC # BLD AUTO: 6.5 K/UL (ref 4.8–10.8)

## 2022-03-08 PROCEDURE — 700101 HCHG RX REV CODE 250: Performed by: SURGERY

## 2022-03-08 PROCEDURE — 80048 BASIC METABOLIC PNL TOTAL CA: CPT

## 2022-03-08 PROCEDURE — 770001 HCHG ROOM/CARE - MED/SURG/GYN PRIV*

## 2022-03-08 PROCEDURE — 99232 SBSQ HOSP IP/OBS MODERATE 35: CPT | Performed by: INTERNAL MEDICINE

## 2022-03-08 PROCEDURE — 85025 COMPLETE CBC W/AUTO DIFF WBC: CPT

## 2022-03-08 PROCEDURE — 36415 COLL VENOUS BLD VENIPUNCTURE: CPT

## 2022-03-08 PROCEDURE — 700111 HCHG RX REV CODE 636 W/ 250 OVERRIDE (IP): Performed by: INTERNAL MEDICINE

## 2022-03-08 RX ADMIN — POTASSIUM CHLORIDE, DEXTROSE MONOHYDRATE AND SODIUM CHLORIDE: 150; 5; 450 INJECTION, SOLUTION INTRAVENOUS at 10:04

## 2022-03-08 RX ADMIN — ENOXAPARIN SODIUM 40 MG: 40 INJECTION SUBCUTANEOUS at 04:28

## 2022-03-08 ASSESSMENT — PAIN DESCRIPTION - PAIN TYPE: TYPE: SURGICAL PAIN

## 2022-03-08 ASSESSMENT — ENCOUNTER SYMPTOMS
VOMITING: 0
MYALGIAS: 1
CHILLS: 0
NAUSEA: 0
FEVER: 0
CONSTIPATION: 1
ABDOMINAL PAIN: 1

## 2022-03-08 NOTE — PROGRESS NOTES
3/8  Pt seen and examined, sitting up in bed, having bowel function, denies any nausea or emesis with PO intake.  Wants to advance diet today, pain remains well controlled    Abdomen benign, wounds healing well    Ok to advance diet.  Ready for discharge home from surgical standpoint.

## 2022-03-08 NOTE — PROGRESS NOTES
"Hospital Medicine Daily Progress Note    Date of Service  3/8/2022    Chief Complaint  Carmela Galloway is a 61 y.o. female admitted 3/6/2022 with worsening nausea and vomiting    Hospital Course  Per HPI:  \"Carmela Galloway is a 61 y.o. female who presented 3/6/2022 with abdominal pain.  Patient underwent rectosigmoid colon resection for newly diagnosed cancer on 3/1/2022 by Dr. Gamboa.  She does report having a bowel movement after surgery and tolerating a diet however since then in the last few days she is been constipated and as of last night has also had nausea and vomiting for most of the night.  Patient denies overt abdominal pain but states that she feels bloated, has been taking Tylenol as needed at home.  Patient denies any fevers, chills, shortness of breath or chest pain.  Patient also reports some difficulty with urination, denies dysuria.      In the ER patient initially tachycardic, labs show elevated hemoglobin and negative UA.  CT abdomen pelvis showed mildly dilated loops of small intestine with bowel thickening within the left lower quadrant may represent ileus versus early partial small bowel obstruction.  General surgery consulted by the ER.\"    Interval Problem Update  Colon cancer-no acute issues    Ileus-feels bloated and not passing much gas, but has no nausea or vomiting. Wants her diet advanced. She feels that her combination IBS-C/D is acting up as well. Very hungry and thirsty. Ambulating well and pain is tolerable.     I have personally seen and examined the patient at bedside. I discussed the plan of care with patient and family.    Consultants/Specialty  general surgery    Code Status  Full Code    Disposition  Patient is not medically cleared for discharge.   Anticipate discharge to to home with close outpatient follow-up.  I have placed the appropriate orders for post-discharge needs.    Review of Systems  Review of Systems   Constitutional: Negative for chills and fever. "   Gastrointestinal: Positive for abdominal pain and constipation (mild gas passing). Negative for nausea and vomiting.   Musculoskeletal: Positive for myalgias.   All other systems reviewed and are negative.       Physical Exam  Temp:  [36.2 °C (97.2 °F)-37.3 °C (99.1 °F)] 37.3 °C (99.1 °F)  Pulse:  [82-86] 82  Resp:  [17-18] 17  BP: (101-112)/(65-67) 101/66  SpO2:  [93 %-96 %] 93 %    Physical Exam  Vitals and nursing note reviewed.   Constitutional:       General: She is not in acute distress.     Appearance: She is not ill-appearing.      Comments: Appears comfortable   HENT:      Head: Normocephalic and atraumatic.      Nose: No congestion.      Mouth/Throat:      Mouth: Mucous membranes are dry.      Pharynx: Oropharynx is clear.   Eyes:      Extraocular Movements: Extraocular movements intact.      Conjunctiva/sclera: Conjunctivae normal.   Cardiovascular:      Rate and Rhythm: Normal rate.      Pulses: Normal pulses.      Heart sounds: Normal heart sounds.   Pulmonary:      Effort: Pulmonary effort is normal. No respiratory distress.      Breath sounds: Normal breath sounds. No wheezing.   Abdominal:      General: Bowel sounds are normal. There is distension (mild).      Palpations: Abdomen is soft.      Tenderness: There is abdominal tenderness (mild).      Comments: Multiple small surgical scars with dermabond in place, appear C/D/I   Musculoskeletal:      Cervical back: Normal range of motion and neck supple.   Skin:     General: Skin is warm and dry.   Neurological:      General: No focal deficit present.      Mental Status: She is alert and oriented to person, place, and time.      Cranial Nerves: No cranial nerve deficit.   Psychiatric:         Mood and Affect: Mood normal.         Thought Content: Thought content normal.         Fluids    Intake/Output Summary (Last 24 hours) at 3/8/2022 1331  Last data filed at 3/8/2022 0320  Gross per 24 hour   Intake 480 ml   Output --   Net 480 ml        Laboratory  Recent Labs     03/06/22  1142 03/07/22  0159 03/08/22  0431   WBC 9.6 7.8 6.5   RBC 5.30 4.30 4.00*   HEMOGLOBIN 16.7* 13.6 12.7   HEMATOCRIT 49.7* 40.4 37.7   MCV 93.8 94.0 94.3   MCH 31.5 31.6 31.8   MCHC 33.6 33.7 33.7   RDW 43.4 43.8 42.9   PLATELETCT 228 211 224   MPV 10.3 10.4 10.0     Recent Labs     03/06/22  1142 03/07/22  0159 03/08/22  0431   SODIUM 138 137 138   POTASSIUM 4.7 3.9 3.9   CHLORIDE 102 102 104   CO2 21 23 26   GLUCOSE 119* 105* 109*   BUN 15 14 10   CREATININE 0.80 0.66 0.62   CALCIUM 9.8 8.8 8.6                   Imaging  CT-ABDOMEN-PELVIS WITH   Final Result      1.  Mildly dilated loops of small intestine with bowel wall thickening within the left lower quadrant. The colon is also decompressed. This may represent presence of ileus versus early partial small bowel obstruction. The bowel wall thickening could also    potentially represent presence of enteritis.      2.  Small amount of free fluid and a small amount of free air within the upper abdomen and within the pelvis likely related to recent surgical procedure.      3.  Recent rectosigmoid anastomosis.      4.  Large hepatic cyst.      5.  Bibasilar atelectasis.           Assessment/Plan  * Ileus following gastrointestinal surgery (HCC)- (present on admission)  Assessment & Plan  Clinically improving, good bowel sounds on exam, minimal flatus   CT shows ileus versus early partial small bowel obstruction  General surgery consulted -- reviewed pathology with patient  Continue IV fluids, but reduce rate  Advance diet further  Supportive care    Adenocarcinoma (HCC)- (present on admission)  Assessment & Plan  Noted on pathology of the colon from resection, stage III with clear margins  Follow up with outpatient oncology, her oncologist is in [place]    Polycythemia- (present on admission)  Assessment & Plan  resolved    Transaminitis- (present on admission)  Assessment & Plan  Fatty change in the liver on CT with large  lobulated right lobe hepatic cyst       VTE prophylaxis: enoxaparin ppx    I have performed a physical exam and reviewed and updated ROS and Plan today (3/8/2022). In review of yesterday's note (3/7/2022), there are no changes except as documented above.

## 2022-03-08 NOTE — PROGRESS NOTES
Pt is A&O x4, calls appropriately  Pain 3/10   - nausea  Tolerating a full diet   x4 lap sites HEMA  + Voids  + flatus  Hypoactive bowel sounds  Last BM 3/7  Up self  Bed alarm off, pt no fall risk per shelia hernandez  Reviewed plan of care with patient, bed in lowest position and locked, pt resting comfortably now, call light within reach, all needs met at this time. Interventions will be executed per plan of care

## 2022-03-08 NOTE — CARE PLAN
Problem: Knowledge Deficit - Standard  Goal: Patient and family/care givers will demonstrate understanding of plan of care, disease process/condition, diagnostic tests and medications  Outcome: Progressing   Pt educated on plan of care this shift,  Problem: Pain - Standard  Goal: Alleviation of pain or a reduction in pain to the patient’s comfort goal  Outcome: Progressing   Pt denies pain at this time   The patient is Stable - Low risk of patient condition declining or worsening    Shift Goals  Clinical Goals: Tolerate Diet  Patient Goals: Tolerate Diet  Family Goals: N/A    Progress made toward(s) clinical / shift goals:  Pt denies nausea at this time     Patient is not progressing towards the following goals:

## 2022-03-08 NOTE — PROGRESS NOTES
Assumed care of patient at 0645. Bedside report received.   Assessment complete.  AA&Ox4. Denies CP/SOB.  Reporting 0/10 pain. Declined intervention at this time.  Educated patient regarding pharmacologic and non pharmacologic modalities for pain management.  Skin per flowsheets  Started on full liquid/ lactose free diet. Denies N/V.  + void. + BM. Last BM 3/7  Pt ambulates independently  All needs met at this time. Call light within reach. Pt calls appropriately. Bed low and locked, non skid socks in place. Hourly rounding in place.

## 2022-03-08 NOTE — CARE PLAN
The patient is Stable - Low risk of patient condition declining or worsening    Shift Goals  Clinical Goals: rest  Patient Goals: rest    Progress made toward(s) clinical / shift goals:  pt resting in bed    Patient is not progressing towards the following goals:        Problem: Knowledge Deficit - Standard  Goal: Patient and family/care givers will demonstrate understanding of plan of care, disease process/condition, diagnostic tests and medications  Outcome: Progressing     Problem: Pain - Standard  Goal: Alleviation of pain or a reduction in pain to the patient’s comfort goal  Outcome: Progressing

## 2022-03-09 VITALS
DIASTOLIC BLOOD PRESSURE: 68 MMHG | BODY MASS INDEX: 21.34 KG/M2 | HEART RATE: 76 BPM | SYSTOLIC BLOOD PRESSURE: 105 MMHG | RESPIRATION RATE: 16 BRPM | OXYGEN SATURATION: 95 % | WEIGHT: 125 LBS | HEIGHT: 64 IN | TEMPERATURE: 97.9 F

## 2022-03-09 PROCEDURE — 99239 HOSP IP/OBS DSCHRG MGMT >30: CPT | Performed by: INTERNAL MEDICINE

## 2022-03-09 PROCEDURE — 700111 HCHG RX REV CODE 636 W/ 250 OVERRIDE (IP): Performed by: INTERNAL MEDICINE

## 2022-03-09 RX ADMIN — ENOXAPARIN SODIUM 40 MG: 40 INJECTION SUBCUTANEOUS at 04:32

## 2022-03-09 ASSESSMENT — PAIN DESCRIPTION - PAIN TYPE: TYPE: SURGICAL PAIN

## 2022-03-09 NOTE — PROGRESS NOTES
3/9  Pt seen and examined, sitting up in bed, tolerating PO, having bowel function.  Wounds c/d/i and healing well.  Remains ready for discharge home from surgery standpoint.

## 2022-03-09 NOTE — DISCHARGE INSTRUCTIONS
Discharge Instructions    Discharged to home by car with relative. Discharged via wheelchair, hospital escort: Yes.  Special equipment needed: Not Applicable    Be sure to schedule a follow-up appointment with your primary care doctor or any specialists as instructed.     Discharge Plan:   Influenza Vaccine Indication: Patient Refuses    I understand that a diet low in cholesterol, fat, and sodium is recommended for good health. Unless I have been given specific instructions below for another diet, I accept this instruction as my diet prescription.   Other diet: N/A    Special Instructions: None    · Is patient discharged on Warfarin / Coumadin?   No     Depression / Suicide Risk    As you are discharged from this [facility] facility, it is important to learn how to keep safe from harming yourself.    Recognize the warning signs:  · Abrupt changes in personality, positive or negative- including increase in energy   · Giving away possessions  · Change in eating patterns- significant weight changes-  positive or negative  · Change in sleeping patterns- unable to sleep or sleeping all the time   · Unwillingness or inability to communicate  · Depression  · Unusual sadness, discouragement and loneliness  · Talk of wanting to die  · Neglect of personal appearance   · Rebelliousness- reckless behavior  · Withdrawal from people/activities they love  · Confusion- inability to concentrate     If you or a loved one observes any of these behaviors or has concerns about self-harm, here's what you can do:  · Talk about it- your feelings and reasons for harming yourself  · Remove any means that you might use to hurt yourself (examples: pills, rope, extension cords, firearm)  · Get professional help from the community (Mental Health, Substance Abuse, psychological counseling)  · Do not be alone:Call your Safe Contact- someone whom you trust who will be there for you.  · Call your local CRISIS HOTLINE 702-4822 or  679.501.1367  · Call your local Children's Mobile Crisis Response Team [place] (807) 317-6095 or www.[employer]  · Call the toll free National Suicide Prevention Hotlines   · National Suicide Prevention Lifeline 661-940-EOPJ (4877)  · [employer] Line Network 800-SUICIDE (585-4075)    Ileus    Ileus is a condition that happens when the intestines, which are also called bowels, stop working correctly. The intestines are hollow organs that digest food after the food leaves the stomach. These organs are long, muscular tubes that connect the stomach to the rectum. When ileus occurs, the muscular contractions that cause food to move through the intestines do not happen as they normally would.  If the intestines stop working, food cannot pass through to get digested. This condition is a serious problem that usually requires hospitalization. It can cause symptoms such as nausea, abdominal pain, and bloating. Ileus can last from a few hours to a few days.  What are the causes?  This condition may be caused by:  · Surgery on the abdomen.  · An infection or inflammation in the abdomen. This includes inflammation of the lining of the abdomen (peritonitis).  · Infection or inflammation in other parts of the body, such as pneumonia or pancreatitis.  · Passage of gallstones or kidney stones.  · Damage to the nerves or blood vessels that go to the intestines.  · A collection of blood within the abdominal cavity.  · Imbalance in the salts in the blood (electrolytes).  · Injury to the brain or spinal cord.  · Medicines. Many medicines, including strong pain medicines, can cause ileus or make it worse.  If the intestines stop working because of a blockage, that is a different condition that is called a bowel obstruction.  What are the signs or symptoms?  Symptoms of this condition include:  · Bloating of the abdomen.  · Pain or discomfort in the abdomen.  · Poor appetite.  · Nausea and vomiting.  · Lack of normal bowel  "sounds, such as \"growling\" in the stomach.  How is this diagnosed?  This condition may be diagnosed with:  · A physical exam and medical history.  · X-rays or a CT scan of the abdomen.  You may also have other tests to help find the cause of the condition.  How is this treated?  This condition may be treated by:  · Resting the intestines until they start to work again. This is often done by:  ? Stopping oral intake of food and drink. You will be given fluid through an IV to prevent dehydration.  ? Placing a small tube (nasogastric tube or NG tube) that is passed through your nose and into your stomach. The tube is attached to a suction device and keeps the stomach emptied out. This allows the bowels to rest and helps to reduce nausea and vomiting.  · Correcting any electrolyte imbalance by giving supplements in the IV fluid.  · Stopping any medicines that might make ileus worse.  · Treating any condition that may have caused ileus.  Follow these instructions at home:  Eating and drinking    · Follow instructions from your health care provider about:  ? What to eat and drink. You may be told to start eating a bland diet. Over time, you may slowly resume a more normal, healthy diet.  ? How much to eat and drink. You should eat small meals often and stop eating when you feel full.  · Avoid alcohol.  General instructions  · Take over-the-counter and prescription medicines only as told by your health care provider.  · Rest as told by your health care provider.  · Avoid sitting for a long time without moving. Get up to take short walks every 1-2 hours. Ask for help if you feel weak or unsteady.  · Keep all follow-up visits as told by your health care provider. This is important.  Contact a health care provider if:  · You have nausea, vomiting, or abdominal discomfort.  · You have a fever.  Get help right away if:  · You have severe abdominal pain or bloating.  · You cannot eat or drink without vomiting.  Summary  · Ileus " is a condition that happens when the intestines, which are also called bowels, stop working correctly.  · When ileus occurs, the muscular contractions that cause food to move through the intestines do not happen as they normally would.  · Ileus can cause symptoms such as nausea, abdominal pain, and bloating.  · Treatment may involve getting IV fluids and having a nasogastric tube placed to keep your stomach emptied out until the intestines start working again.  This information is not intended to replace advice given to you by your health care provider. Make sure you discuss any questions you have with your health care provider.  Document Released: 12/20/2004 Document Revised: 04/15/2019 Document Reviewed: 04/15/2019  Elsevier Patient Education © 2020 [employer]

## 2022-03-09 NOTE — PROGRESS NOTES
Assumed care of patient at 0645. Bedside report received.   Assessment complete.  AA&Ox4. Denies CP/SOB.  Reporting 0/10 pain. Declined intervention at this time.  Educated patient regarding pharmacologic and non pharmacologic modalities for pain management.  Skin per flowsheets  Tolerating regular diet. Denies N/V.  + void. + BM. Last BM 3/9  Pt ambulates independently   All needs met at this time. Call light within reach. Pt calls appropriately. Bed low and locked, non skid socks in place. Hourly rounding in place.

## 2022-03-09 NOTE — CARE PLAN
The patient is Stable - Low risk of patient condition declining or worsening    Shift Goals  Clinical Goals: Tolerate PO  Patient Goals: Rest    Progress made toward(s) clinical / shift goals:  Pt with regular diet, pt resting in bed    Patient is not progressing towards the following goals:          Problem: Knowledge Deficit - Standard  Goal: Patient and family/care givers will demonstrate understanding of plan of care, disease process/condition, diagnostic tests and medications  Outcome: Progressing     Problem: Pain - Standard  Goal: Alleviation of pain or a reduction in pain to the patient’s comfort goal  Outcome: Progressing  Flowsheets (Taken 3/8/2022 1954)  Pain Rating Scale (NPRS): 0

## 2022-03-09 NOTE — DISCHARGE SUMMARY
Discharge Summary    CHIEF COMPLAINT ON ADMISSION  Chief Complaint   Patient presents with   • Abdominal Pain     Had a sigmoid resection on Tuesday, only taking tylenol for pain   • N/V   • Constipation     Has not passed gas or had a BM since surgery   • Post-Op Complications       Reason for Admission  post op complications     Admission Date  3/6/2022    CODE STATUS  Full Code    HPI & HOSPITAL COURSE  This is a 61 y.o. female here with above medical issues. She was found to have a post operative ileus and was re-admitted to the general surgical floor. Colorectal surgery was consulted. Pathology confirmed Stage III colorectal cancer with clear surgical margins. Conservative treatment was initiated. Her symptoms eventually improved and diet was advanced with good outcomes. She was advised to go slow with her diet at home and get close follow up with colorectal surgery and oncology for possible outpatient chemotherapy.    No notes on file    Therefore, she is discharged in good and stable condition to home with close outpatient follow-up.    The patient met 2-midnight criteria for an inpatient stay at the time of discharge.    Discharge Date  3/09/2022     FOLLOW UP ITEMS POST DISCHARGE  F/U with Dr Gamboa of colorectal surgery in 1 week for post operative check  F/U with her oncologist at [facility] for outpatient chemo options  F/U with her PCP in 1-2 weeks for regular medical issues.    DISCHARGE DIAGNOSES  Principal Problem:    Ileus following gastrointestinal surgery (HCC) POA: Yes  Active Problems:    Transaminitis POA: Yes    Polycythemia POA: Yes    Adenocarcinoma (HCC) POA: Yes  Resolved Problems:    * No resolved hospital problems. *      FOLLOW UP  No future appointments.  Israel Gamboa M.D.  [address]  [facility] [address] [phone]    On 3/14/2022      [facility]  [address] [place] [address] [facility] [address] [phone]  In 2 weeks      Van  MICHELLE Fajardo M.D.  [address] [facility]  [address]  [place] [id number] [phone]    In 1 week      Israel Gamboa M.D.  [address]  [address]  [place] [address] [phone]    In 2 weeks  Follow up for your abdominal surgery      MEDICATIONS ON DISCHARGE     Medication List      CONTINUE taking these medications      Instructions   acetaminophen 500 MG Tabs  Commonly known as: TYLENOL   Take 500-1,000 mg by mouth every 6 hours as needed for Mild Pain.  Dose: 500-1,000 mg     COD LIVER OIL PO   Take 1 Capsule by mouth every day.  Dose: 1 Capsule     VITAMIN D PO   Take 1 Tablet by mouth every day.  Dose: 1 Tablet            Allergies  Allergies   Allergen Reactions   • Quinolones Unspecified     Dizzy with nalidixic acid         DIET  Orders Placed This Encounter   Procedures   • Diet Order Diet: Regular; Miscellaneous modifications: (optional): Lactose Free     Standing Status:   Standing     Number of Occurrences:   1     Order Specific Question:   Diet:     Answer:   Regular [1]     Order Specific Question:   Miscellaneous modifications: (optional)     Answer:   Lactose Free [5]       ACTIVITY  As tolerated.  Weight bearing as tolerated    CONSULTATIONS  Colorectal surgery    PROCEDURES  CT-ABDOMEN-PELVIS WITH   Final Result      1.  Mildly dilated loops of small intestine with bowel wall thickening within the left lower quadrant. The colon is also decompressed. This may represent presence of ileus versus early partial small bowel obstruction. The bowel wall thickening could also    potentially represent presence of enteritis.      2.  Small amount of free fluid and a small amount of free air within the upper abdomen and within the pelvis likely related to recent surgical procedure.      3.  Recent rectosigmoid anastomosis.      4.  Large hepatic cyst.      5.  Bibasilar atelectasis.            LABORATORY  Lab Results   Component Value Date    SODIUM 138 03/08/2022    POTASSIUM 3.9 03/08/2022     CHLORIDE 104 03/08/2022    CO2 26 03/08/2022    GLUCOSE 109 (H) 03/08/2022    BUN 10 03/08/2022    CREATININE 0.62 03/08/2022        Lab Results   Component Value Date    WBC 6.5 03/08/2022    HEMOGLOBIN 12.7 03/08/2022    HEMATOCRIT 37.7 03/08/2022    PLATELETCT 224 03/08/2022        Total time of the discharge process exceeds 43   minutes.

## 2022-03-09 NOTE — PROGRESS NOTES
Pt is A&O x4, calls appropriately  Pain 0/10   - nausea  Tolerating a regukar diet   x4 lap sites HEMA  + Voids  + flatus  Hypoactive bowel sounds  Last BM 3/7  Up self  Bed alarm off, pt no fall risk per shelia hernandez  Reviewed plan of care with patient, bed in lowest position and locked, pt resting comfortably now, call light within reach, all needs met at this time. Interventions will be executed per plan of care

## 2022-03-09 NOTE — PROGRESS NOTES
Discharging Patient home per physician order.  Discharged with spouse.  Demonstrated understanding of discharge instructions, follow up appointments, home medications, prescriptions, home care for surgical wound.  Ambulating without assistance, voiding without difficulty, pain well controlled, tolerating oral medications, oxygen saturation greater than 90% , tolerating diet. Educational handouts given and discussed.  Verbalized understanding of discharge instructions and educational handouts.  Stated several reasons why to return to ED or seek medical attention. All questions answered.  Belongings and dressing supplies with patient at time of discharge.

## 2022-04-19 ENCOUNTER — OFFICE VISIT (OUTPATIENT)
Dept: MEDICAL GROUP | Facility: LAB | Age: 62
End: 2022-04-19
Payer: COMMERCIAL

## 2022-04-19 VITALS
RESPIRATION RATE: 12 BRPM | SYSTOLIC BLOOD PRESSURE: 88 MMHG | DIASTOLIC BLOOD PRESSURE: 68 MMHG | OXYGEN SATURATION: 97 % | WEIGHT: 127 LBS | HEIGHT: 64 IN | TEMPERATURE: 98 F | HEART RATE: 82 BPM | BODY MASS INDEX: 21.68 KG/M2

## 2022-04-19 DIAGNOSIS — T81.31XD DEHISCENCE OF OPERATIVE WOUND, SUBSEQUENT ENCOUNTER: ICD-10-CM

## 2022-04-19 DIAGNOSIS — C80.1 ADENOCARCINOMA (HCC): ICD-10-CM

## 2022-04-19 PROCEDURE — 99212 OFFICE O/P EST SF 10 MIN: CPT | Performed by: FAMILY MEDICINE

## 2022-04-19 ASSESSMENT — FIBROSIS 4 INDEX: FIB4 SCORE: 1.02874148254110694

## 2022-04-19 NOTE — PROGRESS NOTES
"Subjective:     Chief Complaint   Patient presents with   • Wound Check         HPI:   Carmela presents today with concerns over an open and area of the surgical incision.  She recently had a sigmoid resection for sigmoid colon cancer and overall things have been healing well except for this 1 area of the incision.  She reports that this is opened up over the last few days and is bleeding a little bit.  She did notice a retained suture in 1 area which may be a reason why it was not closing.  She is having a little bit of bleeding but no purulent material as far she knows.     Nontender.     Current Outpatient Medications Ordered in Epic   Medication Sig Dispense Refill   • VITAMIN D PO Take 1 Tablet by mouth every day.     • COD LIVER OIL PO Take 1 Capsule by mouth every day.     • acetaminophen (TYLENOL) 500 MG Tab Take 500-1,000 mg by mouth every 6 hours as needed for Mild Pain. (Patient not taking: Reported on 4/19/2022)       No current [facility]-ordered facility-administered medications on file.         ROS:  Gen: no fevers/chills, no changes in weight  Eyes: no changes in vision  ENT: no sore throat, no hearing loss, no bloody nose  Pulm: no sob, no cough  CV: no chest pain, no palpitations  GI: no nausea/vomiting, no diarrhea  : no dysuria  MSk: no myalgias      Objective:     Exam:  BP (!) 88/68 (BP Location: Left arm, Patient Position: Sitting, BP Cuff Size: Adult)   Pulse 82   Temp 36.7 °C (98 °F)   Resp 12   Ht 1.626 m (5' 4\")   Wt 57.6 kg (127 lb)   SpO2 97%   BMI 21.80 kg/m²  Body mass index is 21.8 kg/m².    Gen: AAOx3, NAD, well appearing  HEENT: NCAT, EOMI, Nares patent, Mucosa moist  Resp: Normal chest wall rise and fall, not SOB, no tachypnea  Skin: Approximately 1 cm of superficial incision dehiscence of surgical wound.  Minimal serous sanguinous drainage.  Nontender.  No evidence of infection.  Psych: normal speech, not slurred, good insight, affect full  MSK: Moves all four limbs equally and " normally, gait normal        Assessment & Plan:     62 y.o. female with the following -     1. Adenocarcinoma (HCC)  Doing very well with recovery from this.    2. Dehiscence of operative wound, subsequent encounter  Discussed superficial dehiscence.  She is really just concerned about it being open or possibly starting to open a little bit more.  Dermabond is used to try to close the incision and then Steri-Strips were placed over the top of this as well.  We discussed care and cleansing and avoiding standing water for the next 2 weeks at least.  She will let us know if she starts to drain any purulent drainage or if the wound is indeed getting bigger.  This should heal by secondary intention.            No follow-ups on file.    Please note that this dictation was created using voice recognition software. I have made every reasonable attempt to correct obvious errors, but I expect that there are errors of grammar and possibly content that I did not discover before finalizing the note.

## 2022-10-07 ENCOUNTER — HOSPITAL ENCOUNTER (OUTPATIENT)
Dept: LAB | Facility: MEDICAL CENTER | Age: 62
End: 2022-10-07
Attending: FAMILY MEDICINE
Payer: COMMERCIAL

## 2022-10-07 ENCOUNTER — OFFICE VISIT (OUTPATIENT)
Dept: MEDICAL GROUP | Facility: LAB | Age: 62
End: 2022-10-07
Payer: COMMERCIAL

## 2022-10-07 VITALS
BODY MASS INDEX: 21.68 KG/M2 | RESPIRATION RATE: 15 BRPM | HEART RATE: 100 BPM | WEIGHT: 127 LBS | OXYGEN SATURATION: 95 % | TEMPERATURE: 99.9 F | SYSTOLIC BLOOD PRESSURE: 98 MMHG | HEIGHT: 64 IN | DIASTOLIC BLOOD PRESSURE: 62 MMHG

## 2022-10-07 DIAGNOSIS — K52.9 GASTROENTERITIS: ICD-10-CM

## 2022-10-07 LAB
ALBUMIN SERPL BCP-MCNC: 3.8 G/DL (ref 3.2–4.9)
ALBUMIN/GLOB SERPL: 1.3 G/DL
ALP SERPL-CCNC: 93 U/L (ref 30–99)
ALT SERPL-CCNC: 15 U/L (ref 2–50)
ANION GAP SERPL CALC-SCNC: 10 MMOL/L (ref 7–16)
AST SERPL-CCNC: 14 U/L (ref 12–45)
BILIRUB SERPL-MCNC: 0.2 MG/DL (ref 0.1–1.5)
BUN SERPL-MCNC: 14 MG/DL (ref 8–22)
CALCIUM SERPL-MCNC: 9.2 MG/DL (ref 8.4–10.2)
CHLORIDE SERPL-SCNC: 101 MMOL/L (ref 96–112)
CO2 SERPL-SCNC: 25 MMOL/L (ref 20–33)
CREAT SERPL-MCNC: 0.69 MG/DL (ref 0.5–1.4)
ERYTHROCYTE [DISTWIDTH] IN BLOOD BY AUTOMATED COUNT: 44 FL (ref 35.9–50)
GFR SERPLBLD CREATININE-BSD FMLA CKD-EPI: 97 ML/MIN/1.73 M 2
GLOBULIN SER CALC-MCNC: 3 G/DL (ref 1.9–3.5)
GLUCOSE SERPL-MCNC: 102 MG/DL (ref 65–99)
HCT VFR BLD AUTO: 37.1 % (ref 37–47)
HGB BLD-MCNC: 12 G/DL (ref 12–16)
MCH RBC QN AUTO: 30.4 PG (ref 27–33)
MCHC RBC AUTO-ENTMCNC: 32.3 G/DL (ref 33.6–35)
MCV RBC AUTO: 93.9 FL (ref 81.4–97.8)
PLATELET # BLD AUTO: 229 K/UL (ref 164–446)
PMV BLD AUTO: 9.4 FL (ref 9–12.9)
POTASSIUM SERPL-SCNC: 4.2 MMOL/L (ref 3.6–5.5)
PROT SERPL-MCNC: 6.8 G/DL (ref 6–8.2)
RBC # BLD AUTO: 3.95 M/UL (ref 4.2–5.4)
SODIUM SERPL-SCNC: 136 MMOL/L (ref 135–145)
WBC # BLD AUTO: 5.7 K/UL (ref 4.8–10.8)

## 2022-10-07 PROCEDURE — 36415 COLL VENOUS BLD VENIPUNCTURE: CPT

## 2022-10-07 PROCEDURE — 85027 COMPLETE CBC AUTOMATED: CPT

## 2022-10-07 PROCEDURE — 80053 COMPREHEN METABOLIC PANEL: CPT

## 2022-10-07 PROCEDURE — 99214 OFFICE O/P EST MOD 30 MIN: CPT | Performed by: FAMILY MEDICINE

## 2022-10-07 RX ORDER — AZITHROMYCIN 500 MG/1
500 TABLET, FILM COATED ORAL DAILY
Qty: 4 TABLET | Refills: 0 | Status: SHIPPED | OUTPATIENT
Start: 2022-10-07 | End: 2022-10-11

## 2022-10-07 ASSESSMENT — FIBROSIS 4 INDEX: FIB4 SCORE: 1.02874148254110694

## 2022-10-07 NOTE — PROGRESS NOTES
"Subjective:   Carmela Galloway is a 62 y.o. female here today for   No chief complaint on file.    #Gastroenteritis:  -For last 2 weeks patient's been having several episodes of diarrhea daily accompanied by fevers.  This started while on vacation in the [facility].  States the highest has been 101 °F.  She states the diarrheal episodes have resolved and no more secretive diarrhea was still very pervasive.  She states the fevers have been improving well last subjective fever was last night with a temperature at 99 °F.  She has been taking antidiarrheals.  Endorses occasional nausea.  Denies any vomiting.  Denies any blood in stool, black dark or tarry stools.  No other sick contacts.      Allergies   Allergen Reactions    Quinolones Unspecified     Dizzy with nalidixic acid           Current medicines (including changes today)  Current Outpatient Medications   Medication Sig Dispense Refill    azithromycin (ZITHROMAX) 500 MG tablet Take 1 Tablet by mouth every day for 4 days. 4 Tablet 0    acetaminophen (TYLENOL) 500 MG Tab Take 500-1,000 mg by mouth every 6 hours as needed for Mild Pain. (Patient not taking: Reported on 4/19/2022)      VITAMIN D PO Take 1 Tablet by mouth every day.      COD LIVER OIL PO Take 1 Capsule by mouth every day.       No current facility-administered medications for this visit.     She  has a past medical history of Cancer (HCC) (2020) and Cataract (2019).    ROS   -See hpi        Objective:     Physical Exam:  BP (!) 98/62   Pulse 100   Temp 37.7 °C (99.9 °F) (Temporal)   Resp 15   Ht 1.626 m (5' 4.02\")   Wt 57.6 kg (127 lb)   SpO2 95%  Body mass index is 21.79 kg/m².   Constitutional: Alert, no distress.  Eye: Equal, round and reactive, conjunctiva clear, lids normal.  Respiratory: Unlabored respiratory effort, lungs clear to auscultation, no wheezes, no rhonchi.  Cardiovascular: Normal S1, S2, no murmur, no edema.  Abdomen: Soft, non-tender, no masses, no " hepatosplenomegaly.  Psych: Alert and oriented x3, normal affect and mood.    Assessment and Plan:     1. Gastroenteritis  Signs and symptoms consistent with gastroenteritis.  Given region of world where she contracted the symptoms we will treat with a course of azithromycin.  Can continue antidiarrheals if needed.  We will check labs as well to rule out any other secondary cause.  Return precautions were given and patient will follow-up if needed.  - azithromycin (ZITHROMAX) 500 MG tablet; Take 1 Tablet by mouth every day for 4 days.  Dispense: 4 Tablet; Refill: 0  - CBC WITHOUT DIFFERENTIAL; Future  - Comp Metabolic Panel; Future        Followup: No follow-ups on file.         PLEASE NOTE: This dictation was created using voice recognition software. I have made every reasonable attempt to correct obvious errors, but I expect that there are errors of grammar and possibly content that I did not discover before finalizing the note.

## 2022-10-11 ENCOUNTER — OFFICE VISIT (OUTPATIENT)
Dept: MEDICAL GROUP | Facility: LAB | Age: 62
End: 2022-10-11
Payer: COMMERCIAL

## 2022-10-11 VITALS
RESPIRATION RATE: 12 BRPM | OXYGEN SATURATION: 94 % | HEIGHT: 64 IN | TEMPERATURE: 98.3 F | BODY MASS INDEX: 21.51 KG/M2 | HEART RATE: 86 BPM | WEIGHT: 126 LBS

## 2022-10-11 DIAGNOSIS — L52 ERYTHEMA NODOSUM: ICD-10-CM

## 2022-10-11 DIAGNOSIS — B09 VIRAL RASH: ICD-10-CM

## 2022-10-11 PROCEDURE — 99213 OFFICE O/P EST LOW 20 MIN: CPT | Performed by: FAMILY MEDICINE

## 2022-10-11 ASSESSMENT — FIBROSIS 4 INDEX: FIB4 SCORE: 1.01

## 2022-10-11 NOTE — PROGRESS NOTES
"Subjective:     Chief Complaint   Patient presents with    Follow-Up         HPI:   Carmela presents today with gastroenteritis. This is better. Started azithro last week, finished last night.   On the last day that she had the diarrhea she also developed a diffuse papular rash at both upper thighs as well as her back and the backs of her arms.  This is not itchy and is not vesicular in any way.  She does also have a couple more under the skin harder nodules that are slightly tender and red on her legs.  This all happened toward the end of the diarrheal illness and before she started taking her azithromycin but is gotten worse the last few days.    Current Outpatient Medications Ordered in Epic   Medication Sig Dispense Refill    azithromycin (ZITHROMAX) 500 MG tablet Take 1 Tablet by mouth every day for 4 days. 4 Tablet 0    acetaminophen (TYLENOL) 500 MG Tab Take 500-1,000 mg by mouth every 6 hours as needed for Mild Pain. (Patient not taking: Reported on 4/19/2022)      VITAMIN D PO Take 1 Tablet by mouth every day.      COD LIVER OIL PO Take 1 Capsule by mouth every day.       No current [facility]-ordered facility-administered medications on file.         ROS:  Gen: no fevers/chills, no changes in weight  Eyes: no changes in vision  ENT: no sore throat, no hearing loss, no bloody nose  Pulm: no sob, no cough  CV: no chest pain, no palpitations  GI: no nausea/vomiting, no diarrhea  : no dysuria  MSk: no myalgias  Neuro: no headaches, no numbness/tingling  Heme/Lymph: no easy bruising      Objective:     Exam:  Pulse 86   Temp 36.8 °C (98.3 °F)   Resp 12   Ht 1.626 m (5' 4\")   Wt 57.2 kg (126 lb)   SpO2 94%   BMI 21.63 kg/m²  Body mass index is 21.63 kg/m².    Gen: AAOx3, NAD, well appearing  HEENT: NCAT, EOMI, Nares patent, Mucosa moist  Resp: Normal chest wall rise and fall, not SOB, no tachypnea  Skin: Diffuse papular rash present at thighs low back and spreading to the backs of her arms as well.  No " drainage noted.  Does cross the midline.  Nonblanching.  She also has a couple nodules which appear similar to erythema nodosum at her lower legs at the anterior shin.  Psych: normal speech, not slurred, good insight, affect full  MSK: Moves all four limbs equally and normally, gait normal        Assessment & Plan:     62 y.o. female with the following -     1. Viral rash  Discussed possible association with recent GI irritant illness and viral rash.  She is offered a prednisone burst to see if we get this to quiet down but she declines this today.She is finished with her azithromycin and she should definitely stay off for now.  GI symptoms seem to have resolved this.     2. Erythema nodosum  Discussed erythema nodosum which essentially is inflammation of the fatty tissue under the skin in response to viral illness.  These often go away on their own without any need for treatment.  They can be very tender.  We did discuss possible biopsy in the future if this does not seem to be resolving.  She will touch base with us on Friday afternoon so that we can make some decisions with regard to her health and ability to travel over the weekend.          No follow-ups on file.    Please note that this dictation was created using voice recognition software. I have made every reasonable attempt to correct obvious errors, but I expect that there are errors of grammar and possibly content that I did not discover before finalizing the note.

## 2022-10-12 ENCOUNTER — PATIENT MESSAGE (OUTPATIENT)
Dept: HEALTH INFORMATION MANAGEMENT | Facility: OTHER | Age: 62
End: 2022-10-12

## 2022-10-14 ENCOUNTER — OFFICE VISIT (OUTPATIENT)
Dept: MEDICAL GROUP | Facility: LAB | Age: 62
End: 2022-10-14
Payer: COMMERCIAL

## 2022-10-14 VITALS
OXYGEN SATURATION: 96 % | SYSTOLIC BLOOD PRESSURE: 106 MMHG | HEART RATE: 72 BPM | TEMPERATURE: 97.8 F | BODY MASS INDEX: 21.51 KG/M2 | HEIGHT: 64 IN | RESPIRATION RATE: 12 BRPM | WEIGHT: 126 LBS | DIASTOLIC BLOOD PRESSURE: 66 MMHG

## 2022-10-14 DIAGNOSIS — B09 VIRAL RASH: ICD-10-CM

## 2022-10-14 PROBLEM — C18.7 CANCER OF SIGMOID COLON (HCC): Status: ACTIVE | Noted: 2022-03-26

## 2022-10-14 PROCEDURE — 99212 OFFICE O/P EST SF 10 MIN: CPT | Performed by: FAMILY MEDICINE

## 2022-10-14 ASSESSMENT — FIBROSIS 4 INDEX: FIB4 SCORE: 1.01

## 2022-10-14 NOTE — PROGRESS NOTES
"Subjective:     Chief Complaint   Patient presents with    Follow-Up         HPI:   Carmela presents today with follow-up of skin rash likely related to viral illness.  Was seen earlier this week.  She does report she thinks things have gotten better since earlier.  There is less lesions and they seem less bright red and more flat less raised.  She continues to deny any itching or irritation from the lesions.  She does have some travel planned and wants to know she can continue this.  Even the lumps on her legs seem to be better as well.            Current Outpatient Medications Ordered in Epic   Medication Sig Dispense Refill    acetaminophen (TYLENOL) 500 MG Tab Take 500-1,000 mg by mouth every 6 hours as needed for Mild Pain. (Patient not taking: Reported on 4/19/2022)      VITAMIN D PO Take 1 Tablet by mouth every day.      COD LIVER OIL PO Take 1 Capsule by mouth every day.       No current [facility]-ordered facility-administered medications on file.         ROS:  Gen: no fevers/chills, no changes in weight  Eyes: no changes in vision  ENT: no sore throat, no hearing loss, no bloody nose  Pulm: no sob, no cough  CV: no chest pain, no palpitations  GI: no nausea/vomiting, no diarrhea  : no dysuria  MSk: no myalgias  Skin: no rash  Neuro: no headaches, no numbness/tingling  Heme/Lymph: no easy bruising      Objective:     Exam:  /66 (BP Location: Right arm, Patient Position: Sitting, BP Cuff Size: Adult)   Pulse 72   Temp 36.6 °C (97.8 °F)   Resp 12   Ht 1.626 m (5' 4\")   Wt 57.2 kg (126 lb)   SpO2 96%   BMI 21.63 kg/m²  Body mass index is 21.63 kg/m².    Gen: AAOx3, NAD, well appearing  HEENT: NCAT, EOMI, Nares patent, Mucosa moist  Resp: Normal chest wall rise and fall, not SOB, no tachypnea  Skin: Rash seen previously is much less intense in terms of color.  Lesions are fewer and they are more flat and not as raised.  Psych: normal speech, not slurred, good insight, affect full  MSK: Moves all four " limbs equally and normally, gait normal        Assessment & Plan:     62 y.o. female with the following -     1. Viral rash  Things do seem to be resolving.  I do think it safe for her to go travel and would she is not at this point need any further work-up for this.  If it continues to get better than this is problem solved.  If things remain or continue or get worse again she will let us know and we can consider biopsy.          No follow-ups on file.    Please note that this dictation was created using voice recognition software. I have made every reasonable attempt to correct obvious errors, but I expect that there are errors of grammar and possibly content that I did not discover before finalizing the note.

## 2023-08-10 ENCOUNTER — APPOINTMENT (RX ONLY)
Dept: URBAN - METROPOLITAN AREA CLINIC 38 | Facility: CLINIC | Age: 63
Setting detail: DERMATOLOGY
End: 2023-08-10

## 2023-08-10 DIAGNOSIS — D22 MELANOCYTIC NEVI: ICD-10-CM

## 2023-08-10 DIAGNOSIS — L81.4 OTHER MELANIN HYPERPIGMENTATION: ICD-10-CM

## 2023-08-10 DIAGNOSIS — Z71.89 OTHER SPECIFIED COUNSELING: ICD-10-CM

## 2023-08-10 DIAGNOSIS — L82.1 OTHER SEBORRHEIC KERATOSIS: ICD-10-CM

## 2023-08-10 DIAGNOSIS — D18.0 HEMANGIOMA: ICD-10-CM

## 2023-08-10 PROBLEM — D22.9 MELANOCYTIC NEVI, UNSPECIFIED: Status: ACTIVE | Noted: 2023-08-10

## 2023-08-10 PROBLEM — D18.01 HEMANGIOMA OF SKIN AND SUBCUTANEOUS TISSUE: Status: ACTIVE | Noted: 2023-08-10

## 2023-08-10 PROCEDURE — 99203 OFFICE O/P NEW LOW 30 MIN: CPT

## 2023-08-10 PROCEDURE — ? COUNSELING

## 2023-08-10 ASSESSMENT — LOCATION DETAILED DESCRIPTION DERM
LOCATION DETAILED: RIGHT MEDIAL TRAPEZIAL NECK
LOCATION DETAILED: RIGHT INFERIOR UPPER BACK
LOCATION DETAILED: LEFT CLAVICULAR NECK
LOCATION DETAILED: PERIUMBILICAL SKIN
LOCATION DETAILED: INFERIOR MID FOREHEAD
LOCATION DETAILED: INFERIOR THORACIC SPINE
LOCATION DETAILED: LEFT DISTAL DORSAL FOREARM
LOCATION DETAILED: RIGHT PROXIMAL DORSAL FOREARM

## 2023-08-10 ASSESSMENT — LOCATION SIMPLE DESCRIPTION DERM
LOCATION SIMPLE: LEFT FOREARM
LOCATION SIMPLE: UPPER BACK
LOCATION SIMPLE: RIGHT FOREARM
LOCATION SIMPLE: RIGHT UPPER BACK
LOCATION SIMPLE: POSTERIOR NECK
LOCATION SIMPLE: INFERIOR FOREHEAD
LOCATION SIMPLE: ABDOMEN
LOCATION SIMPLE: LEFT ANTERIOR NECK

## 2023-08-10 ASSESSMENT — LOCATION ZONE DERM
LOCATION ZONE: FACE
LOCATION ZONE: TRUNK
LOCATION ZONE: ARM
LOCATION ZONE: NECK

## 2024-09-10 ENCOUNTER — APPOINTMENT (RX ONLY)
Dept: URBAN - METROPOLITAN AREA CLINIC 38 | Facility: CLINIC | Age: 64
Setting detail: DERMATOLOGY
End: 2024-09-10

## 2024-09-10 DIAGNOSIS — L81.4 OTHER MELANIN HYPERPIGMENTATION: ICD-10-CM

## 2024-09-10 DIAGNOSIS — D18.0 HEMANGIOMA: ICD-10-CM

## 2024-09-10 DIAGNOSIS — L82.1 OTHER SEBORRHEIC KERATOSIS: ICD-10-CM

## 2024-09-10 DIAGNOSIS — Z71.89 OTHER SPECIFIED COUNSELING: ICD-10-CM

## 2024-09-10 DIAGNOSIS — D22 MELANOCYTIC NEVI: ICD-10-CM

## 2024-09-10 PROBLEM — D22.9 MELANOCYTIC NEVI, UNSPECIFIED: Status: ACTIVE | Noted: 2024-09-10

## 2024-09-10 PROBLEM — D18.01 HEMANGIOMA OF SKIN AND SUBCUTANEOUS TISSUE: Status: ACTIVE | Noted: 2024-09-10

## 2024-09-10 PROCEDURE — ? COUNSELING

## 2024-09-10 PROCEDURE — 99213 OFFICE O/P EST LOW 20 MIN: CPT

## 2024-09-10 ASSESSMENT — LOCATION ZONE DERM
LOCATION ZONE: ARM
LOCATION ZONE: NECK
LOCATION ZONE: FACE
LOCATION ZONE: TRUNK

## 2024-09-10 ASSESSMENT — LOCATION SIMPLE DESCRIPTION DERM
LOCATION SIMPLE: LEFT ANTERIOR NECK
LOCATION SIMPLE: INFERIOR FOREHEAD
LOCATION SIMPLE: RIGHT FOREARM
LOCATION SIMPLE: LEFT FOREARM
LOCATION SIMPLE: RIGHT UPPER BACK
LOCATION SIMPLE: POSTERIOR NECK
LOCATION SIMPLE: ABDOMEN

## 2024-09-10 ASSESSMENT — LOCATION DETAILED DESCRIPTION DERM
LOCATION DETAILED: RIGHT MEDIAL UPPER BACK
LOCATION DETAILED: RIGHT MEDIAL TRAPEZIAL NECK
LOCATION DETAILED: INFERIOR MID FOREHEAD
LOCATION DETAILED: PERIUMBILICAL SKIN
LOCATION DETAILED: RIGHT PROXIMAL DORSAL FOREARM
LOCATION DETAILED: RIGHT INFERIOR UPPER BACK
LOCATION DETAILED: LEFT DISTAL DORSAL FOREARM
LOCATION DETAILED: LEFT CLAVICULAR NECK

## 2025-03-26 ENCOUNTER — HOSPITAL ENCOUNTER (OUTPATIENT)
Facility: MEDICAL CENTER | Age: 65
End: 2025-03-26
Attending: INTERNAL MEDICINE
Payer: MEDICARE

## 2025-03-26 LAB
ALBUMIN SERPL BCP-MCNC: 4.4 G/DL (ref 3.2–4.9)
ALBUMIN/GLOB SERPL: 1.7 G/DL
ALP SERPL-CCNC: 78 U/L (ref 30–99)
ALT SERPL-CCNC: 24 U/L (ref 2–50)
ANION GAP SERPL CALC-SCNC: 11 MMOL/L (ref 7–16)
AST SERPL-CCNC: 21 U/L (ref 12–45)
BASOPHILS # BLD AUTO: 0.3 % (ref 0–1.8)
BASOPHILS # BLD: 0.02 K/UL (ref 0–0.12)
BILIRUB SERPL-MCNC: 0.2 MG/DL (ref 0.1–1.5)
BUN SERPL-MCNC: 25 MG/DL (ref 8–22)
CALCIUM ALBUM COR SERPL-MCNC: 9.7 MG/DL (ref 8.5–10.5)
CALCIUM SERPL-MCNC: 10 MG/DL (ref 8.4–10.2)
CEA SERPL-MCNC: 0.9 NG/ML (ref 0–3)
CHLORIDE SERPL-SCNC: 103 MMOL/L (ref 96–112)
CO2 SERPL-SCNC: 25 MMOL/L (ref 20–33)
CREAT SERPL-MCNC: 0.76 MG/DL (ref 0.5–1.4)
EOSINOPHIL # BLD AUTO: 0.03 K/UL (ref 0–0.51)
EOSINOPHIL NFR BLD: 0.5 % (ref 0–6.9)
ERYTHROCYTE [DISTWIDTH] IN BLOOD BY AUTOMATED COUNT: 43.8 FL (ref 35.9–50)
GFR SERPLBLD CREATININE-BSD FMLA CKD-EPI: 86 ML/MIN/1.73 M 2
GLOBULIN SER CALC-MCNC: 2.6 G/DL (ref 1.9–3.5)
GLUCOSE SERPL-MCNC: 101 MG/DL (ref 65–99)
HCT VFR BLD AUTO: 44.1 % (ref 37–47)
HGB BLD-MCNC: 14.5 G/DL (ref 12–16)
IMM GRANULOCYTES # BLD AUTO: 0.02 K/UL (ref 0–0.11)
IMM GRANULOCYTES NFR BLD AUTO: 0.3 % (ref 0–0.9)
LYMPHOCYTES # BLD AUTO: 1.88 K/UL (ref 1–4.8)
LYMPHOCYTES NFR BLD: 28.9 % (ref 22–41)
MCH RBC QN AUTO: 31.3 PG (ref 27–33)
MCHC RBC AUTO-ENTMCNC: 32.9 G/DL (ref 32.2–35.5)
MCV RBC AUTO: 95.2 FL (ref 81.4–97.8)
MONOCYTES # BLD AUTO: 0.31 K/UL (ref 0–0.85)
MONOCYTES NFR BLD AUTO: 4.8 % (ref 0–13.4)
NEUTROPHILS # BLD AUTO: 4.25 K/UL (ref 1.82–7.42)
NEUTROPHILS NFR BLD: 65.2 % (ref 44–72)
NRBC # BLD AUTO: 0 K/UL
NRBC BLD-RTO: 0 /100 WBC (ref 0–0.2)
PLATELET # BLD AUTO: 197 K/UL (ref 164–446)
PMV BLD AUTO: 10.8 FL (ref 9–12.9)
POTASSIUM SERPL-SCNC: 4.5 MMOL/L (ref 3.6–5.5)
PROT SERPL-MCNC: 7 G/DL (ref 6–8.2)
RBC # BLD AUTO: 4.63 M/UL (ref 4.2–5.4)
SODIUM SERPL-SCNC: 139 MMOL/L (ref 135–145)
WBC # BLD AUTO: 6.5 K/UL (ref 4.8–10.8)

## 2025-03-26 PROCEDURE — 86300 IMMUNOASSAY TUMOR CA 15-3: CPT

## 2025-03-26 PROCEDURE — 80053 COMPREHEN METABOLIC PANEL: CPT

## 2025-03-26 PROCEDURE — 82378 CARCINOEMBRYONIC ANTIGEN: CPT

## 2025-03-26 PROCEDURE — 36415 COLL VENOUS BLD VENIPUNCTURE: CPT

## 2025-03-26 PROCEDURE — 85025 COMPLETE CBC W/AUTO DIFF WBC: CPT

## 2025-03-28 LAB — CANCER AG27-29 SERPL-ACNC: 28.9 U/ML

## (undated) DEVICE — ROBOTIC SURGERY SERVICES

## (undated) DEVICE — SEALER VESSEL EXTEND FROM DAVINCI ENERGY (6EA/BX)

## (undated) DEVICE — RELOAD STAPLER SUREFORM 60 GREEN (12EA/BX)

## (undated) DEVICE — NEEDLE NON SAFETY HYPO 22 GA X 1 1/2 IN (100/BX)

## (undated) DEVICE — SUTURE 0 PDS CT-2 (36PK/BX)

## (undated) DEVICE — ELECTRODE 850 FOAM ADHESIVE - HYDROGEL RADIOTRNSPRNT (50/PK)

## (undated) DEVICE — Device

## (undated) DEVICE — STAPLER CIRCULAR POWERED 29MM ECHELON (3EA/BX)

## (undated) DEVICE — SUTURE 2-0 ETHILON FS - (36/BX) 18 INCH

## (undated) DEVICE — STAPLER SUREFORM 60 12 MM (6EA/BX)

## (undated) DEVICE — PROTECTOR ULNA NERVE - (36PR/CA)

## (undated) DEVICE — COVER TIP ENDOWRIST HOT SHEAR - (10EA/BX) DA VINCI

## (undated) DEVICE — SEAL 5MM-8MM UNIVERSAL  BOX OF 10

## (undated) DEVICE — SET LEADWIRE 5 LEAD BEDSIDE DISPOSABLE ECG (1SET OF 5/EA)

## (undated) DEVICE — PAD OR TABLE DA VINCI 2IN X 20IN X 72IN - (12EA/CA)

## (undated) DEVICE — TOWEL STOP TIMEOUT SAFETY FLAG (40EA/CA)

## (undated) DEVICE — SUTURE 3-0 VICRYL PLUS SH - 8X 18 INCH (12/BX)

## (undated) DEVICE — SLEEVE, VASO, THIGH, MED

## (undated) DEVICE — HEAD HOLDER JUNIOR/ADULT

## (undated) DEVICE — REDUCER XI STAPLER 12MM TO 8MM (6EA/BX)

## (undated) DEVICE — SENSOR SPO2 NEO LNCS ADHESIVE (20/BX) SEE USER NOTES

## (undated) DEVICE — SHEARS MONOPOLAR CURVED  DA VINCI 10X'S REUSABLE

## (undated) DEVICE — CHLORAPREP 26 ML APPLICATOR - ORANGE TINT(25/CA)

## (undated) DEVICE — SUTURE 0 COATED VICRYL 6-18IN - (12PK/BX)

## (undated) DEVICE — SUTURE 4-0 MONOCRYL PLUS PS-2 - 27 INCH (36/BX)

## (undated) DEVICE — SODIUM CHL IRRIGATION 0.9% 1000ML (12EA/CA)

## (undated) DEVICE — DRAPE ARM  BOX OF 20

## (undated) DEVICE — ELECTRODE DUAL RETURN W/ CORD - (50/PK)

## (undated) DEVICE — SET EXTENSION WITH 2 PORTS (48EA/CA) ***PART #2C8610 IS A SUBSTITUTE*****

## (undated) DEVICE — LACTATED RINGERS INJ 1000 ML - (14EA/CA 60CA/PF)

## (undated) DEVICE — SUTURE NABSB 2-0 KS 30IN PRLN BLUE (36PK/BX)

## (undated) DEVICE — GOWN WARMING STANDARD FLEX - (30/CA)

## (undated) DEVICE — SUTURE GENERAL

## (undated) DEVICE — DRAPE COLUMN  BOX OF 20

## (undated) DEVICE — TUBING CLEARLINK DUO-VENT - C-FLO (48EA/CA)

## (undated) DEVICE — TUBE E-T HI-LO CUFF 7.0MM (10EA/PK)

## (undated) DEVICE — TUBE NG SALEM SUMP 16FR (50EA/CA)

## (undated) DEVICE — KIT ANESTHESIA W/CIRCUIT & 3/LT BAG W/FILTER (20EA/CA)

## (undated) DEVICE — SUCTION INSTRUMENT YANKAUER BULBOUS TIP W/O VENT (50EA/CA)

## (undated) DEVICE — CANISTER SUCTION 3000ML MECHANICAL FILTER AUTO SHUTOFF MEDI-VAC NONSTERILE LF DISP  (40EA/CA)

## (undated) DEVICE — OBTURATOR BLADELESS STANDARD 8MM (6EA/BX)

## (undated) DEVICE — SPONGE DRAIN 4 X 4IN 6-PLY - (2/PK25PK/BX12BX/CS)

## (undated) DEVICE — NEPTUNE 4 PORT MANIFOLD - (20/PK)

## (undated) DEVICE — PACK DAVINCI LOW ANTERIOR RESECTION (1EA/CA)

## (undated) DEVICE — GRASPER SMALL DA VINCI 10X'S REUSABLE

## (undated) DEVICE — MASK ANESTHESIA ADULT  - (100/CA)

## (undated) DEVICE — SEAL CANNULA STAPLER 12 MM (10EA/BX)

## (undated) DEVICE — TRAY CATHETER FOLEY URINE METER W/STATLOCK 350ML (10EA/CA)

## (undated) DEVICE — SET TUBING PNEUMOCLEAR HIGH FLOW SMOKE EVACUATION (10EA/BX)